# Patient Record
Sex: FEMALE | Race: WHITE
[De-identification: names, ages, dates, MRNs, and addresses within clinical notes are randomized per-mention and may not be internally consistent; named-entity substitution may affect disease eponyms.]

---

## 2018-02-08 ENCOUNTER — HOSPITAL ENCOUNTER (OUTPATIENT)
Dept: HOSPITAL 47 - MMGSC | Age: 58
Discharge: HOME | End: 2018-02-08
Payer: COMMERCIAL

## 2018-02-08 DIAGNOSIS — R00.0: ICD-10-CM

## 2018-02-08 DIAGNOSIS — J44.9: Primary | ICD-10-CM

## 2018-02-08 DIAGNOSIS — R73.09: ICD-10-CM

## 2018-02-08 DIAGNOSIS — R10.9: ICD-10-CM

## 2018-02-08 DIAGNOSIS — Z86.79: ICD-10-CM

## 2018-02-08 LAB
ALBUMIN SERPL-MCNC: 4 G/DL (ref 3.5–5)
ALP SERPL-CCNC: 102 U/L (ref 38–126)
ALT SERPL-CCNC: 28 U/L (ref 9–52)
AMYLASE SERPL-CCNC: 61 U/L (ref 30–110)
ANION GAP SERPL CALC-SCNC: 10 MMOL/L
AST SERPL-CCNC: 20 U/L (ref 14–36)
BASOPHILS # BLD AUTO: 0 K/UL (ref 0–0.2)
BASOPHILS NFR BLD AUTO: 0 %
BUN SERPL-SCNC: 18 MG/DL (ref 7–17)
CALCIUM SPEC-MCNC: 9.6 MG/DL (ref 8.4–10.2)
CHLORIDE SERPL-SCNC: 96 MMOL/L (ref 98–107)
CHOLEST SERPL-MCNC: 250 MG/DL (ref ?–200)
CO2 SERPL-SCNC: 31 MMOL/L (ref 22–30)
EOSINOPHIL # BLD AUTO: 0.1 K/UL (ref 0–0.7)
EOSINOPHIL NFR BLD AUTO: 1 %
ERYTHROCYTE [DISTWIDTH] IN BLOOD BY AUTOMATED COUNT: 4.23 M/UL (ref 3.8–5.4)
ERYTHROCYTE [DISTWIDTH] IN BLOOD: 13.5 % (ref 11.5–15.5)
GLUCOSE SERPL-MCNC: 192 MG/DL (ref 74–99)
HCT VFR BLD AUTO: 41.8 % (ref 34–46)
HDLC SERPL-MCNC: 130 MG/DL (ref 40–60)
HGB BLD-MCNC: 13.2 GM/DL (ref 11.4–16)
LDLC SERPL CALC-MCNC: 68 MG/DL (ref 0–99)
LIPASE SERPL-CCNC: 151 U/L (ref 23–300)
LYMPHOCYTES # SPEC AUTO: 1.3 K/UL (ref 1–4.8)
LYMPHOCYTES NFR SPEC AUTO: 13 %
MCH RBC QN AUTO: 31.1 PG (ref 25–35)
MCHC RBC AUTO-ENTMCNC: 31.4 G/DL (ref 31–37)
MCV RBC AUTO: 98.8 FL (ref 80–100)
MONOCYTES # BLD AUTO: 0.4 K/UL (ref 0–1)
MONOCYTES NFR BLD AUTO: 4 %
NEUTROPHILS # BLD AUTO: 7.4 K/UL (ref 1.3–7.7)
NEUTROPHILS NFR BLD AUTO: 80 %
PLATELET # BLD AUTO: 432 K/UL (ref 150–450)
POTASSIUM SERPL-SCNC: 4.4 MMOL/L (ref 3.5–5.1)
PROT SERPL-MCNC: 6.9 G/DL (ref 6.3–8.2)
SODIUM SERPL-SCNC: 137 MMOL/L (ref 137–145)
T4 FREE SERPL-MCNC: 0.81 NG/DL (ref 0.78–2.19)
THEOPHYLLINE SERPL-MCNC: 6.8 UG/ML
TRIGL SERPL-MCNC: 260 MG/DL (ref ?–150)
WBC # BLD AUTO: 9.3 K/UL (ref 3.8–10.6)

## 2018-02-08 PROCEDURE — 80053 COMPREHEN METABOLIC PANEL: CPT

## 2018-02-08 PROCEDURE — 84443 ASSAY THYROID STIM HORMONE: CPT

## 2018-02-08 PROCEDURE — 84439 ASSAY OF FREE THYROXINE: CPT

## 2018-02-08 PROCEDURE — 36415 COLL VENOUS BLD VENIPUNCTURE: CPT

## 2018-02-08 PROCEDURE — 82150 ASSAY OF AMYLASE: CPT

## 2018-02-08 PROCEDURE — 83036 HEMOGLOBIN GLYCOSYLATED A1C: CPT

## 2018-02-08 PROCEDURE — 83690 ASSAY OF LIPASE: CPT

## 2018-02-08 PROCEDURE — 85025 COMPLETE CBC W/AUTO DIFF WBC: CPT

## 2018-02-08 PROCEDURE — 80061 LIPID PANEL: CPT

## 2018-02-08 PROCEDURE — 99204 OFFICE O/P NEW MOD 45 MIN: CPT

## 2018-02-08 PROCEDURE — 80198 ASSAY OF THEOPHYLLINE: CPT

## 2018-02-09 LAB — HBA1C MFR BLD: 7.3 % (ref 4–6)

## 2018-10-26 ENCOUNTER — HOSPITAL ENCOUNTER (OUTPATIENT)
Dept: HOSPITAL 47 - RADCTMAIN | Age: 58
End: 2018-10-26
Attending: INTERNAL MEDICINE
Payer: COMMERCIAL

## 2018-10-26 DIAGNOSIS — R91.8: ICD-10-CM

## 2018-10-26 DIAGNOSIS — J18.9: Primary | ICD-10-CM

## 2018-10-26 PROCEDURE — 71250 CT THORAX DX C-: CPT

## 2018-10-26 NOTE — CT
EXAMINATION TYPE: CT chest wo con

 

DATE OF EXAM: 10/26/2018

 

COMPARISON: None

 

HISTORY: Pulmonary Nodule

 

CT DLP: 131 mGycm, Automated exposure control for dose reduction was used.

 

CONTRAST: None

 

TECHNIQUE: Axial images were obtained at 5 mm thick sections.  Reconstructed images are reviewed on [employer] computer in the coronal plane. 

 

FINDINGS: Portion of the thyroid visualized is normal.

 

Bilateral apical thickening is present. Emphysematous changes are present. Multiple small peripheral 
nodules in the range of 2 mm each are present along the anterior peripheral margins. There is a area 
of increased density measuring approximately 0.6 cm in the periphery of the left upper lobe. Series 4
 image 15. Some calcified small nodules are present scattered bilaterally.

 

No enlarged mediastinal or hilar adenopathy is evident.   The ascending aorta diameter at the level o
f the main pulmonary artery is 2.7 cm.  The main pulmonary artery diameter at the bifurcation is 2.2 
cm. Coronary artery calcification is present.

 

Limited CT sections are obtained through the upper abdomen. Abdomen is essentially unremarkable.

 

IMPRESSIONS:

1. 0.6 cm area of pneumonitis within the periphery of the left upper lung field. Follow-up exam in 6 
months is recommended.

2. Multiple 2 mm peripheral nodules bilaterally. Scattered bilateral calcified nodules are also prese
nt.

## 2019-06-03 ENCOUNTER — HOSPITAL ENCOUNTER (OUTPATIENT)
Dept: HOSPITAL 47 - RADCTMAIN | Age: 59
Discharge: HOME | End: 2019-06-03
Payer: COMMERCIAL

## 2019-06-03 DIAGNOSIS — J43.9: Primary | ICD-10-CM

## 2019-06-03 DIAGNOSIS — J92.9: ICD-10-CM

## 2019-06-03 DIAGNOSIS — R91.8: ICD-10-CM

## 2019-06-03 PROCEDURE — 71250 CT THORAX DX C-: CPT

## 2019-06-03 NOTE — CT
EXAMINATION TYPE: CT chest wo con

 

DATE OF EXAM: 6/3/2019

 

COMPARISON: 10/26/2018

 

HISTORY: Follow up scan per patient

 

CT DLP: 152 mGycm.  Automated Exposure Control for Dose Reduction was Utilized.

 

TECHNIQUE:  CT scan of the thorax is performed without IV contrast.

 

FINDINGS:

 

LUNGS: Bilateral apical thickening is present. Emphysematous changes are present. Multiple small shari
pheral nodules in the range of 2 mm each are present along the anterior peripheral margins. There is 
a area of increased density measuring approximately 0.6 cm in the periphery of the left upper lobe. S
eries 4 image 15. Some calcified small nodules are present scattered bilaterally. 

.

 

MEDIASTINUM: Lack of IV contrast is noted to limit evaluation for mediastinal and especially hilar ad
enopathy. There are no definitive greater than 1 cm hilar or mediastinal lymph nodes. Heart is mildly
 prominent and there is a trace of pericardial fluid. The ascending aorta diameter at the level of th
e main pulmonary artery is 2.7 cm. The main pulmonary artery diameter at the bifurcation is 2.2 cm. C
oronary artery calcification is present. 

 

 

OTHER: Multilevel severe degenerative disc disease with vacuum disc. Chronic appearing endplate compr
ession deformities are seen within the lower thoracic spine. Chronic appearing rib cage deformities o
n the left are noted.

 

IMPRESSION: 

1. There are numerous subcentimeter bilateral pulmonary nodules large number of which are calcified.

Findings stable. Most likely etiology is calcified and noncalcified granulomas. Confirmation of stabi
lity over 2 years recommended.

2. Diffuse emphysematous changes correlate for chronic obstructive pulmonary disease.

3. Stable apical pleural thickening.

## 2019-08-28 ENCOUNTER — HOSPITAL ENCOUNTER (OUTPATIENT)
Dept: HOSPITAL 47 - RADFLMAIN | Age: 59
Discharge: HOME | End: 2019-08-28
Attending: INTERNAL MEDICINE
Payer: COMMERCIAL

## 2019-08-28 DIAGNOSIS — K31.89: ICD-10-CM

## 2019-08-28 DIAGNOSIS — K44.9: ICD-10-CM

## 2019-08-28 DIAGNOSIS — K22.4: Primary | ICD-10-CM

## 2019-08-28 PROCEDURE — 74249: CPT

## 2019-08-29 NOTE — FL
EXAMINATION: Upper GI with small bowel follow through

 

DATE: 8/28/2019 

 

CLINICAL INDICATION: 59-year-old female epigastric pain with nausea and vomiting for one year. Prior 
history of gastric ulcer. Patient also with history of partial colonic resection with colostomy and r
eversal and 2 prior bowel obstructions.

 

COMPARISON: None 

 

Total Fluoroscopy Time: 3 minutes 25 seconds

Total images: 62

 

 

FINDINGS: 

The esophagus has a normal course, caliber, and mucosa. 

 

There are mildly blunted secondary stripping waves allowing for slightly prolonged pooling of contras
t when the patient is supine.

 

There is a small hiatal hernia seen.

 

There is no gastroesophageal reflux identified. 

 

There is mild gastric fundal fold thickening. Otherwise, the stomach and duodenum are free of any per
sistent filling defect and the remainder of the stomach and duodenum demonstrates a normal mucosal pa
ttern. No ulcer is identified.

 

Following administration of barium, serial films were carried out to 30 minutes. Barium is seen to re
ach the colon. Loops of jejunum and ileum are compressed and examined under fluoroscopy. The small narendra
wel loops have a normal-caliber. Mucosal pattern is within normal limits. No intrinsic or extrinsic p
rocess is suspected. 

 

 

IMPRESSION:

 

1. Mild dysmotility with blunted secondary stripping waves allowing for prolonged pooling of contrast
 in the esophagus when the patient is supine.

2. Small hiatal hernia. No gastroesophageal reflux is seen during the course of the exam.

3. Mild gastric fundal fold thickening may reflect mild gastritis. No discrete ulcer is seen.

4. Borderline fast small bowel transit time of 30 minutes. Otherwise, normal small bowel examination

## 2020-02-18 ENCOUNTER — HOSPITAL ENCOUNTER (INPATIENT)
Dept: HOSPITAL 47 - EC | Age: 60
LOS: 3 days | Discharge: HOME | DRG: 190 | End: 2020-02-21
Attending: INTERNAL MEDICINE | Admitting: INTERNAL MEDICINE
Payer: COMMERCIAL

## 2020-02-18 DIAGNOSIS — J44.1: Primary | ICD-10-CM

## 2020-02-18 DIAGNOSIS — Z77.22: ICD-10-CM

## 2020-02-18 DIAGNOSIS — J98.4: ICD-10-CM

## 2020-02-18 DIAGNOSIS — J96.22: ICD-10-CM

## 2020-02-18 DIAGNOSIS — K58.1: ICD-10-CM

## 2020-02-18 DIAGNOSIS — Z79.899: ICD-10-CM

## 2020-02-18 DIAGNOSIS — Z86.19: ICD-10-CM

## 2020-02-18 DIAGNOSIS — E87.3: ICD-10-CM

## 2020-02-18 DIAGNOSIS — F41.9: ICD-10-CM

## 2020-02-18 DIAGNOSIS — Z79.84: ICD-10-CM

## 2020-02-18 DIAGNOSIS — J96.21: ICD-10-CM

## 2020-02-18 DIAGNOSIS — E78.00: ICD-10-CM

## 2020-02-18 DIAGNOSIS — K29.70: ICD-10-CM

## 2020-02-18 DIAGNOSIS — Z90.49: ICD-10-CM

## 2020-02-18 DIAGNOSIS — Z79.82: ICD-10-CM

## 2020-02-18 DIAGNOSIS — E11.9: ICD-10-CM

## 2020-02-18 DIAGNOSIS — M19.90: ICD-10-CM

## 2020-02-18 DIAGNOSIS — I10: ICD-10-CM

## 2020-02-18 DIAGNOSIS — Z79.52: ICD-10-CM

## 2020-02-18 DIAGNOSIS — Z87.891: ICD-10-CM

## 2020-02-18 DIAGNOSIS — Z98.890: ICD-10-CM

## 2020-02-18 DIAGNOSIS — E78.5: ICD-10-CM

## 2020-02-18 DIAGNOSIS — D53.9: ICD-10-CM

## 2020-02-18 DIAGNOSIS — Z99.81: ICD-10-CM

## 2020-02-18 DIAGNOSIS — G89.29: ICD-10-CM

## 2020-02-18 LAB
ALBUMIN SERPL-MCNC: 3.5 G/DL (ref 3.5–5)
ALP SERPL-CCNC: 54 U/L (ref 38–126)
ALT SERPL-CCNC: 10 U/L (ref 4–34)
ANION GAP SERPL CALC-SCNC: 8 MMOL/L
APTT BLD: 25.6 SEC (ref 22–30)
AST SERPL-CCNC: 18 U/L (ref 14–36)
BUN SERPL-SCNC: 13 MG/DL (ref 7–17)
CALCIUM SPEC-MCNC: 8.6 MG/DL (ref 8.4–10.2)
CELLS COUNTED: 100
CHLORIDE SERPL-SCNC: 92 MMOL/L (ref 98–107)
CO2 SERPL-SCNC: 38 MMOL/L (ref 22–30)
EOSINOPHIL # BLD MANUAL: 0.1 K/UL (ref 0–0.7)
ERYTHROCYTE [DISTWIDTH] IN BLOOD BY AUTOMATED COUNT: 3.24 M/UL (ref 3.8–5.4)
ERYTHROCYTE [DISTWIDTH] IN BLOOD: 12.3 % (ref 11.5–15.5)
GLUCOSE BLD-MCNC: 144 MG/DL (ref 75–99)
GLUCOSE BLD-MCNC: 191 MG/DL (ref 75–99)
GLUCOSE SERPL-MCNC: 118 MG/DL (ref 74–99)
HCO3 BLDV-SCNC: 39 MMOL/L (ref 24–28)
HCT VFR BLD AUTO: 33.9 % (ref 34–46)
HGB BLD-MCNC: 10.1 GM/DL (ref 11.4–16)
INR PPP: 1 (ref ?–1.2)
LYMPHOCYTES # BLD MANUAL: 0.78 K/UL (ref 1–4.8)
MAGNESIUM SPEC-SCNC: 1.6 MG/DL (ref 1.6–2.3)
MCH RBC QN AUTO: 31.3 PG (ref 25–35)
MCHC RBC AUTO-ENTMCNC: 29.9 G/DL (ref 31–37)
MCV RBC AUTO: 104.7 FL (ref 80–100)
MONOCYTES # BLD MANUAL: 0.42 K/UL (ref 0–1)
NEUTROPHILS NFR BLD MANUAL: 74 %
NEUTS SEG # BLD MANUAL: 3.9 K/UL (ref 1.3–7.7)
PCO2 BLDV: 63 MMHG (ref 37–51)
PH BLDV: 7.41 [PH] (ref 7.31–7.41)
PLATELET # BLD AUTO: 319 K/UL (ref 150–450)
POTASSIUM SERPL-SCNC: 4.2 MMOL/L (ref 3.5–5.1)
PROT SERPL-MCNC: 6 G/DL (ref 6.3–8.2)
PT BLD: 10.2 SEC (ref 9–12)
SODIUM SERPL-SCNC: 138 MMOL/L (ref 137–145)
WBC # BLD AUTO: 5.2 K/UL (ref 3.8–10.6)

## 2020-02-18 PROCEDURE — 96374 THER/PROPH/DIAG INJ IV PUSH: CPT

## 2020-02-18 PROCEDURE — 83690 ASSAY OF LIPASE: CPT

## 2020-02-18 PROCEDURE — 80048 BASIC METABOLIC PNL TOTAL CA: CPT

## 2020-02-18 PROCEDURE — 83735 ASSAY OF MAGNESIUM: CPT

## 2020-02-18 PROCEDURE — 94660 CPAP INITIATION&MGMT: CPT

## 2020-02-18 PROCEDURE — 82747 ASSAY OF FOLIC ACID RBC: CPT

## 2020-02-18 PROCEDURE — 94640 AIRWAY INHALATION TREATMENT: CPT

## 2020-02-18 PROCEDURE — 85610 PROTHROMBIN TIME: CPT

## 2020-02-18 PROCEDURE — 94760 N-INVAS EAR/PLS OXIMETRY 1: CPT

## 2020-02-18 PROCEDURE — 82803 BLOOD GASES ANY COMBINATION: CPT

## 2020-02-18 PROCEDURE — 71046 X-RAY EXAM CHEST 2 VIEWS: CPT

## 2020-02-18 PROCEDURE — 96375 TX/PRO/DX INJ NEW DRUG ADDON: CPT

## 2020-02-18 PROCEDURE — 87040 BLOOD CULTURE FOR BACTERIA: CPT

## 2020-02-18 PROCEDURE — 85730 THROMBOPLASTIN TIME PARTIAL: CPT

## 2020-02-18 PROCEDURE — 96361 HYDRATE IV INFUSION ADD-ON: CPT

## 2020-02-18 PROCEDURE — 93005 ELECTROCARDIOGRAM TRACING: CPT

## 2020-02-18 PROCEDURE — 87502 INFLUENZA DNA AMP PROBE: CPT

## 2020-02-18 PROCEDURE — 36415 COLL VENOUS BLD VENIPUNCTURE: CPT

## 2020-02-18 PROCEDURE — 84484 ASSAY OF TROPONIN QUANT: CPT

## 2020-02-18 PROCEDURE — 85025 COMPLETE CBC W/AUTO DIFF WBC: CPT

## 2020-02-18 PROCEDURE — 96372 THER/PROPH/DIAG INJ SC/IM: CPT

## 2020-02-18 PROCEDURE — 99285 EMERGENCY DEPT VISIT HI MDM: CPT

## 2020-02-18 PROCEDURE — 96365 THER/PROPH/DIAG IV INF INIT: CPT

## 2020-02-18 PROCEDURE — 82607 VITAMIN B-12: CPT

## 2020-02-18 PROCEDURE — 78227 HEPATOBIL SYST IMAGE W/DRUG: CPT

## 2020-02-18 PROCEDURE — 80053 COMPREHEN METABOLIC PANEL: CPT

## 2020-02-18 RX ADMIN — ATENOLOL SCH: 25 TABLET ORAL at 19:30

## 2020-02-18 RX ADMIN — IPRATROPIUM BROMIDE AND ALBUTEROL SULFATE PRN ML: .5; 3 SOLUTION RESPIRATORY (INHALATION) at 16:33

## 2020-02-18 RX ADMIN — HYDROCODONE BITARTRATE AND ACETAMINOPHEN PRN EACH: 10; 325 TABLET ORAL at 18:58

## 2020-02-18 RX ADMIN — PANTOPRAZOLE SODIUM SCH MG: 40 TABLET, DELAYED RELEASE ORAL at 17:21

## 2020-02-18 RX ADMIN — IPRATROPIUM BROMIDE AND ALBUTEROL SULFATE PRN ML: .5; 3 SOLUTION RESPIRATORY (INHALATION) at 20:29

## 2020-02-18 RX ADMIN — METHYLPREDNISOLONE SODIUM SUCCINATE SCH MG: 125 INJECTION, POWDER, FOR SOLUTION INTRAMUSCULAR; INTRAVENOUS at 23:18

## 2020-02-18 RX ADMIN — HEPARIN SODIUM SCH UNIT: 5000 INJECTION, SOLUTION INTRAVENOUS; SUBCUTANEOUS at 23:18

## 2020-02-18 RX ADMIN — HEPARIN SODIUM SCH UNIT: 5000 INJECTION, SOLUTION INTRAVENOUS; SUBCUTANEOUS at 17:21

## 2020-02-18 RX ADMIN — MONTELUKAST SODIUM SCH MG: 10 TABLET, FILM COATED ORAL at 19:31

## 2020-02-18 RX ADMIN — METHYLPREDNISOLONE SODIUM SUCCINATE SCH MG: 125 INJECTION, POWDER, FOR SOLUTION INTRAMUSCULAR; INTRAVENOUS at 17:21

## 2020-02-18 RX ADMIN — DILTIAZEM HYDROCHLORIDE SCH MG: 240 CAPSULE, COATED, EXTENDED RELEASE ORAL at 17:20

## 2020-02-18 RX ADMIN — ATENOLOL SCH MG: 25 TABLET ORAL at 17:21

## 2020-02-18 NOTE — P.HPIM
History of Present Illness


H&P Date: 02/18/20


Chief Complaint: Difficulty breathing





59-year-old female with history of COPD on home oxygen 2-4 L per nasal cannula





Patient comes in with 1-2 day history of worsening shortness of breath she 

reports that her inhalers and nebulizers are not helping with her symptoms.  She

reports the last hospitalization for trouble breathing was within 1 year but she

has not been intubated over the past year.  She denies any fevers or chills she 

denies any sick contacts or recent traveling.  She denies any upper respiratory 

symptoms of runny nose sore throat.  She reports chronic cough nonproductive.  

She denies any fevers or chills.  She denies any chest pain.  Her son 

recommended to her that she gets evaluated because she started using the oxygen 

more than her usual usage of occasional 2 L now she is using 4 L nasal cannula 

despite that she's not getting much improvement and relief in her breathing.  

For which she decided to come to the hospital for evaluation


An the ED chest x-ray failed to show any clear infiltrations suggestive of 

pneumonia.  Showed old ground glass appearance which is compatible compared to 

her older CAT scan.


Patient was tachycardic and hypoxic in the ED.


Blood work showed chronic anemia but no leukocytosis


ABG showed elevated CO2.


Patient didn't improve much after multiple breathing treatments in the ED she 

will be admitted for acute COPD exacerbation





Patient also admitted that she is complaining of chronic epigastric abdominal 

pain she felt frustrated about it, she reported that the pain has been going on 

for over a year now she has seen multiple doctors for that including a GI 

specialist and again she claims that nothing has been helping and no one seems 

to care enough to do anything about it





Review of Systems





 











Pertinent positives as noted in HPI. All other systems were reviewed and are 

negative 





Past Medical History


Past Medical History: COPD


History of Any Multi-Drug Resistant Organisms: None Reported


Additional Past Surgical History / Comment(s): "abdominal surgeries."


Past Psychological History: Anxiety


Smoking Status: Never smoker


Past Alcohol Use History: None Reported


Past Drug Use History: None Reported





- Past Family History


  ** Family


Family Medical History: No Reported History





Medications and Allergies


                                    Allergies











Allergy/AdvReac Type Severity Reaction Status Date / Time


 


No Known Allergies Allergy   Verified 02/18/20 15:47














Physical Exam


Vitals: 


                                   Vital Signs











  Temp Pulse Resp BP Pulse Ox


 


 02/18/20 13:36   114 H  28 H  151/88  98


 


 02/18/20 12:20   105 H  20  129/91  99


 


 02/18/20 12:10   102 H   


 


 02/18/20 12:00      98


 


 02/18/20 11:55   100    78 L


 


 02/18/20 11:15      98


 


 02/18/20 11:01  97.2 F L  102 H  18  148/67  100








                                Intake and Output











 02/17/20 02/18/20 02/18/20





 22:59 06:59 14:59


 


Other:   


 


  Weight   50.802 kg














Constitutional:          No acute distress, conversant, pleasant


Eyes:      Anicteric sclerae, moist conjunctiva, 


         Pupils equal round reactive to light





ENMT:      NC/AT


         Oropharynx clear, no erythema, exudates





Neck:      Supple, FROM, no masses, or JVD


         No carotid bruits


         No thyromegaly





Lungs:      Diminished breath sounds throughout prolonged expiratory phase


         Clear to percussion


         Respiratory distress with accessory muscle use





Cardiovascular:      Heart regular in rate and rhythm, 


         No murmurs, gallops, or rubs


         No peripheral edema





Abdominal:       Soft


         Epigastric discomfort to deep palpation, no guarding, rebound or 

rigidity


         Abdomen moving with respiration


         Normoactive bowel sounds


         No hepatomegaly, No splenomegaly


         No palpable mass 


         No abdominal wall hernia noted 





Skin:      Normal temperature, tone, texture, turgor


         No induration


         No subcutaneous nodules 


         No rash, lesions


         No ulcers





Extremities:      No digital cyanosis 


         No clubbing


         Pedal pulses intact and symmetrical


         Radial pulses intact and symmetrical 


         No calf tenderness 





Psychiatric:      Alert and oriented to person, place and time


         Appropriate affect


         fair judgement   


      


Neuro      Muscles Strength 4/5 in all 4 extremities 


         Sensation to light touch grossly present throughout


         Cranial nerves II-XII grossly intact


         No focal sensory deficits


Lymphatics:       no palpable cervical or supraclavicular , or inguinal lymph 

nodes  





Results


CBC & Chem 7: 


                                 02/18/20 11:07





                                 02/18/20 11:07


Labs: 


                  Abnormal Lab Results - Last 24 Hours (Table)











  02/18/20 02/18/20 02/18/20 Range/Units





  11:07 11:07 12:06 


 


RBC  3.24 L    (3.80-5.40)  m/uL


 


Hgb  10.1 L    (11.4-16.0)  gm/dL


 


Hct  33.9 L    (34.0-46.0)  %


 


MCV  104.7 H    (80.0-100.0)  fL


 


MCHC  29.9 L    (31.0-37.0)  g/dL


 


Lymphocytes # (Manual)  0.78 L    (1.0-4.8)  k/uL


 


VBG pCO2    63 H  (37-51)  mmHg


 


VBG HCO3    39 H  (24-28)  mmol/L


 


Chloride   92 L   ()  mmol/L


 


Carbon Dioxide   38 H   (22-30)  mmol/L


 


Creatinine   0.35 L   (0.52-1.04)  mg/dL


 


Glucose   118 H   (74-99)  mg/dL


 


Total Protein   6.0 L   (6.3-8.2)  g/dL














Assessment and Plan


Assessment: 





59-year-old female with COPD on home oxygen 2-4 L comes in due to worsening 

shortness of breath over the past 1-2 days denies any upper respiratory 

infection like symptoms.  In the ED she was found to be hypoxic and tachycardic 

despite repeated breathing treatments patient didn't make much improvement she 

is admitted for acute COPD exacerbation with anticipated length of stay more 

than two midnight


Plan: 





Acute hypoxic hypercapnic respiratory failure secondary to acute COPD 

exacerbation


COPD pathway systemic IV steroids, doxycycline, nebulizers inhalers when 

necessary


CPT


supplemental oxygen as needed


influenza negative


BIpap PRN 10/5








epigastric abd pain 


PPI 


maalox


check lipase


GI consult 





DVT PPX sc heparin tid 





chronic anemia macrocytic 


check B 12 , RBC folate


 


CODE STATUS: no code


Discussed with: Patient, ER, rn


Anticipated length of stay  > than 2 midnights


Anticipated discharge place: home


A total of 60 minutes was spent on the care of this complex patient more than 

50% of the time was spent in counseling and care coordination.

## 2020-02-18 NOTE — XR
EXAMINATION TYPE: XR chest 2V

 

DATE OF EXAM: 2/18/2020

 

COMPARISON: NONE

 

TECHNIQUE: PA and lateral views submitted.

 

HISTORY: Difficulty breathing

 

FINDINGS:

The lungs are clear and  there is no pneumothorax, pleural effusion, or focal pneumonia. Hyperinflati
on suggests COPD. Degenerative changes spine. There are nodular densities suggestive of calcified gra
nuloma. Previous CT demonstrated multiple calcified and noncalcified nodule sclerotic density overlyi
ng the humeral head could represent a bone island. Biapical pleural thickening. Heart size normal. Di
ffuse osteopenia.

 

IMPRESSION:

1. COPD with some subcentimeter pulmonary nodules most likely on the basis of granuloma as reported b
y previous CT scan.

2. Coarsened interstitium can be associated with chronic interstitial lung disease correlate clinical
ly.

## 2020-02-18 NOTE — ED
General Adult HPI





- General


Chief complaint: Shortness of Breath


Stated complaint: MICH


Time Seen by Provider: 02/18/20 11:13


Source: patient, EMS, RN notes reviewed


Mode of arrival: EMS


Limitations: no limitations





- History of Present Illness


Initial comments: 





59-year-old female with a past medical history of COPD on home oxygen presents 

to the emergency department for a chief complaint of shortness of breath.  

Patient states yesterday she started to develop increased shortness of breath.  

Patient states that usually she is on 2 L when she is resting at home and 4 L 

when she is moving.  Patient also admits to a slight cough over the past few 

days.  She denies any chest pain.  She denies fevers or chills. Patient has no 

other complaints at this time including shortness of breath, chest pain, 

abdominal pain, nausea or vomiting, headache, or visual changes.





- Related Data


                                    Allergies











Allergy/AdvReac Type Severity Reaction Status Date / Time


 


No Known Allergies Allergy   Verified 02/18/20 11:00














Review of Systems


ROS Statement: 


Those systems with pertinent positive or pertinent negative responses have been 

documented in the HPI.





ROS Other: All systems not noted in ROS Statement are negative.





Past Medical History


Past Medical History: COPD


History of Any Multi-Drug Resistant Organisms: None Reported


Additional Past Surgical History / Comment(s): "abdominal surgeries."


Past Psychological History: Anxiety


Smoking Status: Never smoker


Past Alcohol Use History: None Reported


Past Drug Use History: None Reported





General Exam


Limitations: no limitations


General appearance: alert, in no apparent distress


Head exam: Present: atraumatic, normocephalic, normal inspection


Eye exam: Present: normal appearance, PERRL, EOMI.  Absent: scleral icterus, 

conjunctival injection, periorbital swelling


ENT exam: Present: normal exam, mucous membranes moist


Neck exam: Present: normal inspection, full ROM.  Absent: tenderness, 

meningismus, lymphadenopathy


Respiratory exam: Present: wheezes, decreased breath sounds.  Absent: 

respiratory distress, rales, rhonchi, stridor


Cardiovascular Exam: Present: regular rate, normal rhythm, normal heart sounds. 

Absent: systolic murmur, diastolic murmur, rubs, gallop, clicks


Neurological exam: Present: alert


Psychiatric exam: Present: normal affect, normal mood





Course


                                   Vital Signs











  02/18/20 02/18/20 02/18/20





  11:01 11:15 11:55


 


Temperature 97.2 F L  


 


Pulse Rate 102 H  100


 


Respiratory 18  





Rate   


 


Blood Pressure 148/67  


 


O2 Sat by Pulse 100 98 78 L





Oximetry   














  02/18/20 02/18/20 02/18/20





  12:00 12:10 12:20


 


Temperature   


 


Pulse Rate  102 H 105 H


 


Respiratory   20





Rate   


 


Blood Pressure   129/91


 


O2 Sat by Pulse 98  99





Oximetry   














EKG Findings





- EKG Comments:


EKG Findings:: Normal sinus rhythm, ventricular rate 100, AK interval 140, QTC 

456





Medical Decision Making





- Medical Decision Making





Vitals are stable presentation.  Patient is 100% on 4 L although it seems she is

supposed to be on 2 L at home at rest.  She does have some minimal tachycardia 

likely related to shortness of breath.  Lung sounds do sound diminished with 

wheezing bilaterally.  Patient was on 2 L of oxygen and apparently desatted to 

78% so she was increased back to 4 L. CBC unremarkable.  White blood cell count 

is normal.  CMP does show high CO2 which is likely chronic but increased 

compared to previous labs.  Influenza is negative.  Chest x-ray shows COPD with 

some subcentimeter pulmonary nodules most likely on the basis of granuloma.  

Patient will be admitted for acute hypoxic respiratory failure secondary to COPD

exacerbation.





- Lab Data


Result diagrams: 


                                 02/18/20 11:07





                                 02/18/20 11:07


                                   Lab Results











  02/18/20 02/18/20 02/18/20 Range/Units





  11:07 11:07 11:07 


 


WBC  5.2    (3.8-10.6)  k/uL


 


RBC  3.24 L    (3.80-5.40)  m/uL


 


Hgb  10.1 L    (11.4-16.0)  gm/dL


 


Hct  33.9 L    (34.0-46.0)  %


 


MCV  104.7 H    (80.0-100.0)  fL


 


MCH  31.3    (25.0-35.0)  pg


 


MCHC  29.9 L    (31.0-37.0)  g/dL


 


RDW  12.3    (11.5-15.5)  %


 


Plt Count  319    (150-450)  k/uL


 


Neutrophils % (Manual)  74    %


 


Band Neutrophils %  1    %


 


Lymphocytes % (Manual)  15    %


 


Monocytes % (Manual)  8    %


 


Eosinophils % (Manual)  2    %


 


Neutrophils # (Manual)  3.90    (1.3-7.7)  k/uL


 


Lymphocytes # (Manual)  0.78 L    (1.0-4.8)  k/uL


 


Monocytes # (Manual)  0.42    (0-1.0)  k/uL


 


Eosinophils # (Manual)  0.10    (0-0.7)  k/uL


 


Nucleated RBCs  0    (0-0)  /100 WBC


 


Manual Slide Review  Performed    


 


Hypochromasia  Marked    


 


Poikilocytosis (manual  Present    


 


Macrocytosis  Slight    


 


PT    10.2  (9.0-12.0)  sec


 


INR    1.0  (<1.2)  


 


APTT    25.6  (22.0-30.0)  sec


 


VBG pH     (7.31-7.41)  


 


VBG pCO2     (37-51)  mmHg


 


VBG HCO3     (24-28)  mmol/L


 


Sodium   138   (137-145)  mmol/L


 


Potassium   4.2   (3.5-5.1)  mmol/L


 


Chloride   92 L   ()  mmol/L


 


Carbon Dioxide   38 H   (22-30)  mmol/L


 


Anion Gap   8   mmol/L


 


BUN   13   (7-17)  mg/dL


 


Creatinine   0.35 L   (0.52-1.04)  mg/dL


 


Est GFR (CKD-EPI)AfAm   >90   (>60 ml/min/1.73 sqM)  


 


Est GFR (CKD-EPI)NonAf   >90   (>60 ml/min/1.73 sqM)  


 


Glucose   118 H   (74-99)  mg/dL


 


Calcium   8.6   (8.4-10.2)  mg/dL


 


Magnesium   1.6   (1.6-2.3)  mg/dL


 


Total Bilirubin   0.6   (0.2-1.3)  mg/dL


 


AST   18   (14-36)  U/L


 


ALT   10   (4-34)  U/L


 


Alkaline Phosphatase   54   ()  U/L


 


Troponin I     (0.000-0.034)  ng/mL


 


Total Protein   6.0 L   (6.3-8.2)  g/dL


 


Albumin   3.5   (3.5-5.0)  g/dL


 


Influenza Type A RNA     (Not Detectd)  


 


Influenza Type B (PCR)     (Not Detectd)  














  02/18/20 02/18/20 02/18/20 Range/Units





  11:07 11:07 12:06 


 


WBC     (3.8-10.6)  k/uL


 


RBC     (3.80-5.40)  m/uL


 


Hgb     (11.4-16.0)  gm/dL


 


Hct     (34.0-46.0)  %


 


MCV     (80.0-100.0)  fL


 


MCH     (25.0-35.0)  pg


 


MCHC     (31.0-37.0)  g/dL


 


RDW     (11.5-15.5)  %


 


Plt Count     (150-450)  k/uL


 


Neutrophils % (Manual)     %


 


Band Neutrophils %     %


 


Lymphocytes % (Manual)     %


 


Monocytes % (Manual)     %


 


Eosinophils % (Manual)     %


 


Neutrophils # (Manual)     (1.3-7.7)  k/uL


 


Lymphocytes # (Manual)     (1.0-4.8)  k/uL


 


Monocytes # (Manual)     (0-1.0)  k/uL


 


Eosinophils # (Manual)     (0-0.7)  k/uL


 


Nucleated RBCs     (0-0)  /100 WBC


 


Manual Slide Review     


 


Hypochromasia     


 


Poikilocytosis (manual     


 


Macrocytosis     


 


PT     (9.0-12.0)  sec


 


INR     (<1.2)  


 


APTT     (22.0-30.0)  sec


 


VBG pH    7.41  (7.31-7.41)  


 


VBG pCO2    63 H  (37-51)  mmHg


 


VBG HCO3    39 H  (24-28)  mmol/L


 


Sodium     (137-145)  mmol/L


 


Potassium     (3.5-5.1)  mmol/L


 


Chloride     ()  mmol/L


 


Carbon Dioxide     (22-30)  mmol/L


 


Anion Gap     mmol/L


 


BUN     (7-17)  mg/dL


 


Creatinine     (0.52-1.04)  mg/dL


 


Est GFR (CKD-EPI)AfAm     (>60 ml/min/1.73 sqM)  


 


Est GFR (CKD-EPI)NonAf     (>60 ml/min/1.73 sqM)  


 


Glucose     (74-99)  mg/dL


 


Calcium     (8.4-10.2)  mg/dL


 


Magnesium     (1.6-2.3)  mg/dL


 


Total Bilirubin     (0.2-1.3)  mg/dL


 


AST     (14-36)  U/L


 


ALT     (4-34)  U/L


 


Alkaline Phosphatase     ()  U/L


 


Troponin I  <0.012    (0.000-0.034)  ng/mL


 


Total Protein     (6.3-8.2)  g/dL


 


Albumin     (3.5-5.0)  g/dL


 


Influenza Type A RNA   Not Detected   (Not Detectd)  


 


Influenza Type B (PCR)   Not Detected   (Not Detectd)  














Disposition


Clinical Impression: 


 COPD exacerbation, Acute respiratory failure with hypoxia





Disposition: ADMITTED AS IP TO THIS HOSP


Condition: Fair


Is patient prescribed a controlled substance at d/c from ED?: No


Referrals: 


Kosta Ledesma MD [Primary Care Provider] - 1-2 days


Time of Disposition: 13:26

## 2020-02-19 LAB
ANION GAP SERPL CALC-SCNC: 8 MMOL/L
BASOPHILS # BLD AUTO: 0 K/UL (ref 0–0.2)
BASOPHILS NFR BLD AUTO: 0 %
BUN SERPL-SCNC: 16 MG/DL (ref 7–17)
CALCIUM SPEC-MCNC: 8.8 MG/DL (ref 8.4–10.2)
CHLORIDE SERPL-SCNC: 94 MMOL/L (ref 98–107)
CO2 SERPL-SCNC: 35 MMOL/L (ref 22–30)
EOSINOPHIL # BLD AUTO: 0 K/UL (ref 0–0.7)
EOSINOPHIL NFR BLD AUTO: 1 %
ERYTHROCYTE [DISTWIDTH] IN BLOOD BY AUTOMATED COUNT: 3.2 M/UL (ref 3.8–5.4)
ERYTHROCYTE [DISTWIDTH] IN BLOOD: 12.4 % (ref 11.5–15.5)
GLUCOSE BLD-MCNC: 151 MG/DL (ref 75–99)
GLUCOSE BLD-MCNC: 160 MG/DL (ref 75–99)
GLUCOSE BLD-MCNC: 175 MG/DL (ref 75–99)
GLUCOSE BLD-MCNC: 177 MG/DL (ref 75–99)
GLUCOSE SERPL-MCNC: 186 MG/DL (ref 74–99)
HCT VFR BLD AUTO: 32.6 % (ref 34–46)
HGB BLD-MCNC: 10.3 GM/DL (ref 11.4–16)
LYMPHOCYTES # SPEC AUTO: 0.3 K/UL (ref 1–4.8)
LYMPHOCYTES NFR SPEC AUTO: 9 %
MCH RBC QN AUTO: 32.2 PG (ref 25–35)
MCHC RBC AUTO-ENTMCNC: 31.6 G/DL (ref 31–37)
MCV RBC AUTO: 101.9 FL (ref 80–100)
MONOCYTES # BLD AUTO: 0.1 K/UL (ref 0–1)
MONOCYTES NFR BLD AUTO: 2 %
NEUTROPHILS # BLD AUTO: 2.9 K/UL (ref 1.3–7.7)
NEUTROPHILS NFR BLD AUTO: 87 %
PLATELET # BLD AUTO: 346 K/UL (ref 150–450)
POTASSIUM SERPL-SCNC: 4.6 MMOL/L (ref 3.5–5.1)
SODIUM SERPL-SCNC: 137 MMOL/L (ref 137–145)
VIT B12 SERPL-MCNC: 337 PG/ML (ref 200–944)
WBC # BLD AUTO: 3.3 K/UL (ref 3.8–10.6)

## 2020-02-19 PROCEDURE — 5A09457 ASSISTANCE WITH RESPIRATORY VENTILATION, 24-96 CONSECUTIVE HOURS, CONTINUOUS POSITIVE AIRWAY PRESSURE: ICD-10-PCS

## 2020-02-19 RX ADMIN — DILTIAZEM HYDROCHLORIDE SCH MG: 240 CAPSULE, COATED, EXTENDED RELEASE ORAL at 08:45

## 2020-02-19 RX ADMIN — ESCITALOPRAM OXALATE SCH MG: 20 TABLET, FILM COATED ORAL at 08:45

## 2020-02-19 RX ADMIN — FORMOTEROL FUMARATE DIHYDRATE SCH MCG: 20 SOLUTION RESPIRATORY (INHALATION) at 19:12

## 2020-02-19 RX ADMIN — INSULIN ASPART SCH UNIT: 100 INJECTION, SOLUTION INTRAVENOUS; SUBCUTANEOUS at 11:53

## 2020-02-19 RX ADMIN — IPRATROPIUM BROMIDE SCH MG: 0.5 SOLUTION RESPIRATORY (INHALATION) at 15:29

## 2020-02-19 RX ADMIN — HYDROCODONE BITARTRATE AND ACETAMINOPHEN PRN EACH: 10; 325 TABLET ORAL at 18:19

## 2020-02-19 RX ADMIN — HEPARIN SODIUM SCH UNIT: 5000 INJECTION, SOLUTION INTRAVENOUS; SUBCUTANEOUS at 23:07

## 2020-02-19 RX ADMIN — INSULIN ASPART SCH UNIT: 100 INJECTION, SOLUTION INTRAVENOUS; SUBCUTANEOUS at 08:46

## 2020-02-19 RX ADMIN — ASPIRIN SCH: 325 TABLET ORAL at 11:43

## 2020-02-19 RX ADMIN — CYANOCOBALAMIN SCH MCG: 1000 INJECTION, SOLUTION INTRAMUSCULAR; SUBCUTANEOUS at 18:22

## 2020-02-19 RX ADMIN — METHYLPREDNISOLONE SODIUM SUCCINATE SCH MG: 125 INJECTION, POWDER, FOR SOLUTION INTRAMUSCULAR; INTRAVENOUS at 11:53

## 2020-02-19 RX ADMIN — HYDROCODONE BITARTRATE AND ACETAMINOPHEN PRN EACH: 10; 325 TABLET ORAL at 04:15

## 2020-02-19 RX ADMIN — BUDESONIDE SCH MG: 1 SUSPENSION RESPIRATORY (INHALATION) at 19:10

## 2020-02-19 RX ADMIN — ATENOLOL SCH MG: 25 TABLET ORAL at 20:19

## 2020-02-19 RX ADMIN — POLYETHYLENE GLYCOL 3350 SCH: 17 POWDER, FOR SOLUTION ORAL at 08:47

## 2020-02-19 RX ADMIN — ATENOLOL SCH MG: 25 TABLET ORAL at 08:44

## 2020-02-19 RX ADMIN — INSULIN ASPART SCH UNIT: 100 INJECTION, SOLUTION INTRAVENOUS; SUBCUTANEOUS at 20:22

## 2020-02-19 RX ADMIN — METHYLPREDNISOLONE SODIUM SUCCINATE SCH MG: 125 INJECTION, POWDER, FOR SOLUTION INTRAMUSCULAR; INTRAVENOUS at 18:17

## 2020-02-19 RX ADMIN — PANTOPRAZOLE SODIUM SCH MG: 40 TABLET, DELAYED RELEASE ORAL at 08:45

## 2020-02-19 RX ADMIN — IPRATROPIUM BROMIDE SCH MG: 0.5 SOLUTION RESPIRATORY (INHALATION) at 08:54

## 2020-02-19 RX ADMIN — AZITHROMYCIN SCH MG: 250 TABLET, FILM COATED ORAL at 08:45

## 2020-02-19 RX ADMIN — METHYLPREDNISOLONE SODIUM SUCCINATE SCH MG: 125 INJECTION, POWDER, FOR SOLUTION INTRAMUSCULAR; INTRAVENOUS at 05:52

## 2020-02-19 RX ADMIN — HYDROCODONE BITARTRATE AND ACETAMINOPHEN PRN EACH: 10; 325 TABLET ORAL at 08:44

## 2020-02-19 RX ADMIN — IPRATROPIUM BROMIDE AND ALBUTEROL SULFATE PRN ML: .5; 3 SOLUTION RESPIRATORY (INHALATION) at 04:36

## 2020-02-19 RX ADMIN — METHYLPREDNISOLONE SODIUM SUCCINATE SCH MG: 125 INJECTION, POWDER, FOR SOLUTION INTRAMUSCULAR; INTRAVENOUS at 23:07

## 2020-02-19 RX ADMIN — HEPARIN SODIUM SCH UNIT: 5000 INJECTION, SOLUTION INTRAVENOUS; SUBCUTANEOUS at 18:18

## 2020-02-19 RX ADMIN — HEPARIN SODIUM SCH UNIT: 5000 INJECTION, SOLUTION INTRAVENOUS; SUBCUTANEOUS at 08:47

## 2020-02-19 RX ADMIN — IPRATROPIUM BROMIDE SCH MG: 0.5 SOLUTION RESPIRATORY (INHALATION) at 12:24

## 2020-02-19 RX ADMIN — MONTELUKAST SODIUM SCH MG: 10 TABLET, FILM COATED ORAL at 20:19

## 2020-02-19 RX ADMIN — INSULIN ASPART SCH UNIT: 100 INJECTION, SOLUTION INTRAVENOUS; SUBCUTANEOUS at 18:25

## 2020-02-19 RX ADMIN — IPRATROPIUM BROMIDE SCH MG: 0.5 SOLUTION RESPIRATORY (INHALATION) at 19:10

## 2020-02-19 RX ADMIN — PANTOPRAZOLE SODIUM SCH MG: 40 TABLET, DELAYED RELEASE ORAL at 18:19

## 2020-02-19 NOTE — P.PN
Subjective


Progress Note Date: 02/19/20


Principal diagnosis: 





follow up for acute COPD exacerbation 





Patient seen and examined still reporting difficulty breathing and some 

component of anxiety


Denies any chest pain


Denies any nausea vomiting , she is still having epigastric discomfort





Objective





- Vital Signs


Vital signs: 


                                   Vital Signs











Temp  97.9 F   02/19/20 07:42


 


Pulse  88   02/19/20 09:05


 


Resp  14   02/19/20 08:00


 


BP  147/81   02/19/20 07:42


 


Pulse Ox  98   02/19/20 07:42








                                 Intake & Output











 02/18/20 02/19/20 02/19/20





 18:59 06:59 18:59


 


Intake Total  960 


 


Balance  960 


 


Weight 50.802 kg  


 


Intake:   


 


  Intake, IV Titration  900 





  Amount   


 


    Sodium Chloride 0.9% 1,  900 





    000 ml @ 75 mls/hr IV .   





    [id number] STA Rx#:[id number]   


 


  Oral  60 


 


Other:   


 


  Voiding Method  Bedside Commode Bedside Commode


 


  # Voids  2 














- Exam





Constitutional:   vital signs stable, Not in acute distress, pleasant, 

conversant 


Lungs: Diminished breath sounds throughout, patient using accessory muscles of 

respiration 


Cardiovascular: Regular rate and rhythm, no murmurs, no gallops, no rubs, no 

peripheral edema


Gastrointestinal: Soft, no tenderness to palpation,  bowel sounds positive 


Extremities: No digital cyanosis or clubbing, peripheral pulses palpable and 

equal ,  no calf muscle tenderness


Psych: Alert, oriented to place, person and time, appropriate affect, intact 

judgment  








- Labs


CBC & Chem 7: 


                                 02/18/20 11:07





                                 02/19/20 09:08


Labs: 


                  Abnormal Lab Results - Last 24 Hours (Table)











  02/18/20 02/18/20 02/18/20 Range/Units





  11:07 11:07 12:06 


 


RBC  3.24 L    (3.80-5.40)  m/uL


 


Hgb  10.1 L    (11.4-16.0)  gm/dL


 


Hct  33.9 L    (34.0-46.0)  %


 


MCV  104.7 H    (80.0-100.0)  fL


 


MCHC  29.9 L    (31.0-37.0)  g/dL


 


Lymphocytes # (Manual)  0.78 L    (1.0-4.8)  k/uL


 


VBG pCO2    63 H  (37-51)  mmHg


 


VBG HCO3    39 H  (24-28)  mmol/L


 


Chloride   92 L   ()  mmol/L


 


Carbon Dioxide   38 H   (22-30)  mmol/L


 


Creatinine   0.35 L   (0.52-1.04)  mg/dL


 


Glucose   118 H   (74-99)  mg/dL


 


POC Glucose (mg/dL)     (75-99)  mg/dL


 


Total Protein   6.0 L   (6.3-8.2)  g/dL














  02/18/20 02/18/20 02/19/20 Range/Units





  17:19 20:11 06:58 


 


RBC     (3.80-5.40)  m/uL


 


Hgb     (11.4-16.0)  gm/dL


 


Hct     (34.0-46.0)  %


 


MCV     (80.0-100.0)  fL


 


MCHC     (31.0-37.0)  g/dL


 


Lymphocytes # (Manual)     (1.0-4.8)  k/uL


 


VBG pCO2     (37-51)  mmHg


 


VBG HCO3     (24-28)  mmol/L


 


Chloride     ()  mmol/L


 


Carbon Dioxide     (22-30)  mmol/L


 


Creatinine     (0.52-1.04)  mg/dL


 


Glucose     (74-99)  mg/dL


 


POC Glucose (mg/dL)  144 H  191 H  177 H  (75-99)  mg/dL


 


Total Protein     (6.3-8.2)  g/dL














  02/19/20 Range/Units





  09:08 


 


RBC   (3.80-5.40)  m/uL


 


Hgb   (11.4-16.0)  gm/dL


 


Hct   (34.0-46.0)  %


 


MCV   (80.0-100.0)  fL


 


MCHC   (31.0-37.0)  g/dL


 


Lymphocytes # (Manual)   (1.0-4.8)  k/uL


 


VBG pCO2   (37-51)  mmHg


 


VBG HCO3   (24-28)  mmol/L


 


Chloride  94 L  ()  mmol/L


 


Carbon Dioxide  35 H  (22-30)  mmol/L


 


Creatinine  0.35 L  (0.52-1.04)  mg/dL


 


Glucose  186 H  (74-99)  mg/dL


 


POC Glucose (mg/dL)   (75-99)  mg/dL


 


Total Protein   (6.3-8.2)  g/dL














Assessment and Plan


Assessment: 





59-year-old female with COPD on home oxygen 2-4 L comes in due to worsening 

shortness of breath over the past 1-2 days denies any upper respiratory 

infection like symptoms.  In the ED she was found to be hypoxic and tachycardic 

despite repeated breathing treatments patient didn't make much improvement she 

is admitted for acute COPD exacerbation  





2/19


Patient still having respiratory distress slightly improving with breathing 

treatments she feels tight


Denies any fevers or chills


Denies any nausea vomiting or chest pain


Plan: 





Acute hypoxic hypercapnic respiratory failure secondary to acute COPD 

exacerbation


COPD pathway systemic IV steroids, doxycycline, nebulizers inhalers when 

necessary


CPT


supplemental oxygen as needed


influenza negative


BIpap PRN 10/5


encouraged patient to bring her own cpap





epigastric abd pain 


PPI 


maalox


lipase normal 


GI consult 





DVT PPX sc heparin tid 





chronic anemia macrocytic 


low normal vitamin B12 level


replace with IM vitamin b12


  





patient uses CPAP at home, encouraged her to bring it , she does not know her 

settings

## 2020-02-19 NOTE — P.CNPUL
History of Present Illness


Consult date: 02/19/20


Reason for consult: dyspnea, COPD


History of present illness: 





Lorazepam 9-year-old female patient is well-known to me.  I taking care of her 

in the past.  She has moved from [place] to [place] and she was 

hospitalized for COPD exacerbation back in 2018..  She is a known case of COPD 

and she has chronic hypoxic respiratory failure.  She has had multiple 

hospitalizations in the past for COPD exacerbation and she can recall at least 

hospitalization over the past several years.  She has required steroids due to 

her chronic steroid use she has developed steroids induced cushingoid features. 

She has been on on long-term steroid use and she was taking 5 mg of by mouth 

steroids on a daily basis.  She has been maintained on DuoNeb neb last treatment

around-the-clock and typically she uses it up to 4 times a day.  She is also on 

oxygen and she also has a Incruse modulation day and Pulmicort and inhalers 1 

puff twice a day.   Her previous spirometer was on a pulmonologist office from 

2017 from [place] showed an FEV1 of 19% of predicted.  The patient is not 

smoking cigarettes for now.  She quit smoking in 2013.  She is diabetic.





The patient came into the hospital yesterday for a few days worth of increased 

shortness of breath.  She stated that inhalers and a nebulizer were not helping.

 She was Hospital as for increased shortness of breath.  Apparently she denies 

having any fever or chills.  She denied having any sick contacts.  She has been 

exposed to secondhand smoking through other people will live with her.  The jodie

ent came into the ED.  Chest x-ray showed emphysema without any acute airspace 

disease.  ABGs showed a pH of 7.41 with a pCO2 of 63 and pO2 of 39.  I think 

this was a venous sample.  Her white cell, was at 3.3 with a hemoglobin of 10.3.

 Normal coagulation profile.  The blood work showed metabolic alkalosis 

consistent with chronic hypercapnic respiratory failure.  The last CAT scan of 

the chest was done in June 2019.  The patient back then showed diffuse 

emphysematous change and numerous subcentimeter bilateral pulmonary nodules with

calcification and this was attributed to her previous chickenpox pneumonia.  

There was emphysematous changes bilaterally.  She also had severe degenerative 

disc disease.  For now patient is on a combination of bronchodilators and 

steroids.  She is on a BiPAP at a pressure of 10/5 cm of water with an FiO2 of 

30%.  She is also covered with Zithromax as an empiric antibiotic coverage.  

Outpatient indication of been ordered resume.





Review of Systems








Constitutional


Constitutional: no fever, no night sweats, no significant weight loss, no 

exercise intolerance, weight gain (22lbs) and now she her weight is stable for 

now.





Eyes


Eyes: no dry eyes, no vision change, no irritation





ENMT


Ears: no difficulty hearing, no ear pain


Nose: no frequent nosebleeds, no nose problems, no sinus problems


Mouth/Throat: no sore throat, no bleeding gums, no snoring, no mouth ulcers, no 

teeth problems, dry mouth





Cardiovascular


Cardiovascular: no chest pain, no arm pain on exertion, no shortness of breath 

when lying down, no palpitations, no known heart murmur, shortness of breath 

when walking





Respiratory


Respiratory: Increased shortness of breath





Gastrointestinal


Gastrointestinal: no abdominal pain, no nausea, no vomiting, no constipation, 

normal appetite, no diarrhea, not vomiting blood, no dyspepsia, no GERD





Genitourinary


Genitourinary: no incontinence, no difficulty urinating, no hematuria, no 

increased frequency





Musculoskeletal


Musculoskeletal: no muscle aches, no muscle weakness, no arthralgias/joint pain,

no back pain, no swelling in the extremities





Integumentary


Skin: no abnormal mole, no jaundice, no rashes, no laceration





Neurologic


Neurologic: no loss of consciousness, no weakness, no numbness, no seizures, no 

dizziness, no migraines, no headaches, no tremor





Psychiatric


Psych: no depression, no sleep disturbances, feeling safe in a relationship, no 

alcohol abuse, no anxiety, no hallucinations, no suicidal thoughts





Endocrine


Endocrine: fatigue





Hematologic/Lymphatic


Hematologic/Lymphatic no swollen glands, no bruising, no excessive bleeding





Allergic/Immunologic


Allergy/Immunologic: no runny nose, no sinus pressure, no itching, no hives, no 

frequent sneezing


Screening








Past Medical History


Past Medical History: COPD, Diabetes Mellitus


Additional Past Medical History / Comment(s): DM type II per chart, but pt 

currently denies, "confusion" when CO2 increased per son, herniated disc, neck 

pain


History of Any Multi-Drug Resistant Organisms: None Reported


Additional Past Surgical History / Comment(s): "abdominal surgeries colectomy 

with diverting colostomy and subsequent reversal.  The patient has a large 

anterior abdominal wall incision for now.,


Past Psychological History: Anxiety


Smoking Status: Never smoker


Past Alcohol Use History: None Reported


Past Drug Use History: None Reported





- Past Family History


  ** Family


Family Medical History: No Reported History





Medications and Allergies


                                Home Medications











 Medication  Instructions  Recorded  Confirmed  Type


 


Albuterol Nebulized [Ventolin 2.5 mg INHALATION RT-Q4H PRN 02/18/20 02/18/20 

History





Nebulized]    


 


Albuterol Sulfate [Ventolin HFA] 2 puff INHALATION RT-QID PRN 02/18/20 02/18/20 

History


 


Aspirin EC [Ecotrin Low Dose] 81 mg PO DAILY 02/18/20 02/18/20 History


 


Atenolol [Tenormin] 25 mg PO BID 02/18/20 02/18/20 History


 


Budesonide [Pulmicort Flexhaler] 1 puff INHALATION RT-BID 02/18/20 02/18/20 

History


 


Diclofenac Sodium [Voltaren Gel] 1 applic TOPICAL DAILY PRN 02/18/20 02/18/20 

History


 


Diltiazem HCl [Diltiazem HCl 24Hr 240 mg PO DAILY 02/18/20 02/18/20 History





ER (Cd)]    


 


Escitalopram [Lexapro] 20 mg PO DAILY 02/18/20 02/18/20 History


 


Fluticasone Nasal Spray [Flonase 1 spray EA NOSTRIL BID PRN 02/18/20 02/18/20 

History





Nasal [place]]    


 


HYDROcodone/APAP 10-325MG [Norco 1 tab PO TID PRN 02/18/20 02/18/20 History





]    


 


Ipratropium-Albuterol Nebulize 3 ml INHALATION RT-QID PRN 02/18/20 02/18/20 

History





[Duoneb 0.5 mg-3 mg/3 ml Soln]    


 


LORazepam [Ativan] 0.25 mg PO TID PRN 02/18/20 02/18/20 History


 


Linaclotide [Linzess] 72 mcg PO DAILY 02/18/20 02/18/20 History


 


Montelukast [Singulair] 10 mg PO HS 02/18/20 02/18/20 History


 


Ondansetron Odt [Zofran Odt] 4 mg PO DAILY 02/18/20 02/18/20 History


 


Pantoprazole Sodium [Protonix] 40 mg PO DAILY 02/18/20 02/18/20 History


 


Polyethylene Glycol 3350 [Miralax] 17 gm PO DAILY 02/18/20 02/18/20 History


 


Umeclidinium Bromide [Incruse 2 puff INHALATION RT-DAILY 02/18/20 02/18/20 

History





Ellipta]    


 


glipiZIDE XL [Glucotrol Xl] 2.5 mg PO DAILY 02/18/20 02/18/20 History


 


metFORMIN HCL [Glucophage] 500 mg PO TID 02/18/20 02/18/20 History


 


predniSONE 10 mg PO DAILY 02/18/20 02/18/20 History








                                    Allergies











Allergy/AdvReac Type Severity Reaction Status Date / Time


 


No Known Allergies Allergy   Verified 02/18/20 15:47














Physical Exam


Vitals: 


                                   Vital Signs











  Temp Pulse Pulse Resp BP BP Pulse Ox


 


 02/19/20 12:46    85  18   


 


 02/19/20 12:44  98.0 F   85  18   132/63  99


 


 02/19/20 12:35   80     


 


 02/19/20 12:25   78     


 


 02/19/20 11:34  98.1 F   85  20   129/71  99


 


 02/19/20 09:05   88     


 


 02/19/20 08:55   80     


 


 02/19/20 08:00    95  14   


 


 02/19/20 07:42  97.9 F   95  14   147/81  98


 


 02/19/20 05:00  97.7 F   91  16   157/83  99


 


 02/19/20 04:45   83     


 


 02/19/20 04:36   83     


 


 02/19/20 00:00     18   


 


 02/18/20 20:44   89     


 


 02/18/20 20:31  98 F   90  18   140/65  95


 


 02/18/20 20:30   89      94 L


 


 02/18/20 19:07   101 H   24  146/98   98


 


 02/18/20 16:42   100     


 


 02/18/20 16:31   100     


 


 02/18/20 15:16   116 H   22  158/81   98


 


 02/18/20 13:36   114 H   28 H  151/88   98








                                Intake and Output











 02/18/20 02/19/20 02/19/20





 22:59 06:59 14:59


 


Intake Total 300 660 


 


Balance 300 660 


 


Intake:   


 


  Intake, IV Titration 300 600 





  Amount   


 


    Sodium Chloride 0.9% 1, 300 600 





    000 ml @ 75 mls/hr IV .   





    [id number] STA Rx#:[id number]   


 


  Oral 0 60 


 


Other:   


 


  Voiding Method  Bedside Commode Bedside Commode


 


  # Voids 1 2 

















General Appearance no diaphoresis, no respiratory distress, speech not 

interrupted by breaths, no dyspnea, no pallor, not cachectic, well nourished, 

appears well, obesity the patient is currently wearing a BiPAP at a fullface 

mask and she is quite synchronous with the mechanical ventilator.  She is having

 some difficulties is taken out.  This is.  She is having some limited shortness

 of breath.


HEENT no pursed lip breathing, no jugular venous distention, no mucous membrane 

cyanosis, no perioral cyanosis, mallampati classification: class 1,  (cushingoid

 features related to steroids)


Chest no retractions, no sternocleidomastoid muscle contractions, no 

supraclavicular retractions, no intercostal retractions, no decreased air 

movement, no rhonchi, no hyperinflation, (normal) adventitious sounds: rales / 

crackles: bilaterally: midlung fields, barrel chest, prolonged expiratory 

wheezing, decreased air movement


Heart no right ventricular heave, no distant heart sounds, no s3 gallop, 

(normal) jugular vein: jugular venous distention: by 0cm, (normal) jugular vein


GI bowel sounds: hyperactive (borborygmi), bowel sounds: diminished or absent, 

the patient is an incision over the mid anterior abdominal wall related to 

previous diverticular surgery and diverting colostomy with subsequent reversal.


Extremities no cyanosis, no clubbing, no edema


Neurologic no decreased mental status, no somnolence, no confusion


Assisstive Devices: ambulates with no assitive devices


Gait and Mobility: gait WNL, full weight bearing





Skin General Appearance normal, (normal) normal except as noted





Results





- Laboratory Findings


CBC and BMP: 


                                 02/19/20 09:08





                                 02/19/20 09:08


PT/INR, D-dimer











PT  10.2 sec (9.0-12.0)   02/18/20  11:07    


 


INR  1.0  (<1.2)   02/18/20  11:07    








Abnormal lab findings: 


                                  Abnormal Labs











  02/18/20 02/18/20 02/18/20





  11:07 11:07 11:07


 


WBC   


 


RBC  3.24 L  


 


Hgb  10.1 L  


 


Hct  33.9 L  


 


MCV  104.7 H  


 


MCHC  29.9 L  


 


Lymphocytes #   


 


Lymphocytes # (Manual)  0.78 L  


 


VBG pCO2   


 


VBG HCO3   


 


Chloride   92 L 


 


Carbon Dioxide   38 H 


 


Creatinine   0.35 L 


 


Glucose   118 H 


 


POC Glucose (mg/dL)   


 


Total Protein   6.0 L 


 


RBC Folate    942 H














  02/18/20 02/18/20 02/18/20





  12:06 17:19 20:11


 


WBC   


 


RBC   


 


Hgb   


 


Hct   


 


MCV   


 


MCHC   


 


Lymphocytes #   


 


Lymphocytes # (Manual)   


 


VBG pCO2  63 H  


 


VBG HCO3  39 H  


 


Chloride   


 


Carbon Dioxide   


 


Creatinine   


 


Glucose   


 


POC Glucose (mg/dL)   144 H  191 H


 


Total Protein   


 


RBC Folate   














  02/19/20 02/19/20 02/19/20





  06:58 09:08 09:08


 


WBC   3.3 L 


 


RBC   3.20 L 


 


Hgb   10.3 L 


 


Hct   32.6 L 


 


MCV   101.9 H 


 


MCHC   


 


Lymphocytes #   0.3 L 


 


Lymphocytes # (Manual)   


 


VBG pCO2   


 


VBG HCO3   


 


Chloride    94 L


 


Carbon Dioxide    35 H


 


Creatinine    0.35 L


 


Glucose    186 H


 


POC Glucose (mg/dL)  177 H  


 


Total Protein   


 


RBC Folate   














  02/19/20





  11:07


 


WBC 


 


RBC 


 


Hgb 


 


Hct 


 


MCV 


 


MCHC 


 


Lymphocytes # 


 


Lymphocytes # (Manual) 


 


VBG pCO2 


 


VBG HCO3 


 


Chloride 


 


Carbon Dioxide 


 


Creatinine 


 


Glucose 


 


POC Glucose (mg/dL)  151 H


 


Total Protein 


 


RBC Folate 














- Diagnostic Findings


Chest x-ray: image reviewed





Assessment and Plan


Plan: 








1 acute COPD exacerbation, with secondary shortness of breath.  The patient 

currently on BiPAP at a pressure of 10/5 with an FiO2 of 30%.  She is on 

examination bronchodilators, steroids and empiric antibiotic coverage with 

Zithromax.  No evidence of pneumonia on today's chest x-ray.





2 severe chronic obstructive pulmonary disease- patient obviously has severe 

COPD.  she is currently on a combination of Incruse and Pulmicort.  Previous 

FEV1 as measures and back in 2017 was in order of 17% of predicted and the 

patient has chronic hypoxic respiratory failure and she is oxygen dependent.  

She is an ex-smoker.  She has extensive smoking history.


 


3 history of chickenpox, with secondary pulmonary calcifications 





4 long term systemic steroid user,  prednisone   5 mg every  day as the patient 

has significant side effects, cushingoid features and weight gain.





5 diabetes mellitus





6 history of multiple bowel surgeries with colectomy and diverting colostomy 

with subsequent reversal





7 chronic hypercapnic respiratory failure with secondary metabolic alkalosis





8 chronic hypoxic respiratory failure





Plan





Continue DuoNeb neb last treatment around-the-clock


IV Solu-Medrol


Perforomist Pulmicort nebulized treatments twice a day


Consider a home ventilator due to her recurrent COPD exacerbation and chronic 

hypoxic/hypercapnic respiratory failure


Empiric antibiotic coverage and Zithromax


We'll continue to follow.

## 2020-02-19 NOTE — CONS
CONSULTATION



DATE OF DICTATION:

02/19/2020



REASON FOR CONSULT:

Epigastric pain.



HISTORY OF PRESENT ILLNESS:

The patient is a 59-year-old pleasant white female with advanced COPD, on home oxygen,

admitted to the hospital because of worsening shortness of breath for the last few

weeks' duration. The reason we are consulted is because of persistent epigastric and

upper abdominal pain for the last 3 years' duration.  The patient is known to me from

office visits.  She has been complaining of severe upper abdominal pain, more like a

tightness, and cannot breathe, for the last 2 years' duration.  She has been

extensively investigated on an outpatient basis by me.  She had multiple upper

endoscopies, a gastric emptying scan, small bowel series, ultrasound of the

gallbladder, CT of the abdomen over the course of the last 6 months, which were all

negative.  In fact, her last upper endoscopy was only a month ago at _____ [facility], that showed mild gastritis.  She was treated with various proton pump

inhibitors, antispasmodics, with no relief.



PAST MEDICAL HISTORY:

Her past medical history is significant for COPD, on home oxygen, diabetes mellitus,

hypercholesterolemia, chronic constipation, degenerative joint disease.



MEDICATIONS:

Medications at home: Albuterol, Ecotrin, Tenormin, Pulmicort, Voltaren, Lexapro,

Linzess, DuoNeb, Ativan, Singulair, Zofran, Protonix, MiraLAX, Ellipta, Glucotrol,

Glucophage and prednisone.



ALLERGIES:

NONE.



SOCIAL HISTORY:

No smoking.  No alcohol use.



FAMILY HISTORY:

Unremarkable.



REVIEW OF SYSTEMS:

CARDIOPULMONARY: Complains of severe shortness of breath.

GENITOURINARY: No dysuria or hematuria.

MUSCULOSKELETAL: Unremarkable.

SKIN: Unremarkable.

ENDOCRINE: Unremarkable.

PSYCHIATRIC: Anxiety, depression.

NEUROLOGY: Unremarkable.

ENT/VISION: Unremarkable.

CONSTITUTIONAL: No weight loss. No fever, chills, night sweats.



PHYSICAL EXAMINATION:

She appears somewhat in distress because of shortness of breath.

VITAL SIGNS:  Blood pressure 132/63, pulse rate 85, temperature 98.

HEENT examination unremarkable. Conjunctivae pink. Sclerae anicteric. Oral cavity no

lesions.

NECK: No JVD or lymph node enlargement.

CHEST: Decreased breath sounds bilaterally.

HEART:  Regular rate and rhythm.

ABDOMEN:  Soft.  Bowel sounds are positive.

EXTREMITIES: No pedal edema.

SKIN: No rashes.

NEUROLOGIC:  Alert and oriented x3.  No focal deficits.



LABS:

Labs from today show WBC 3.3, hemoglobin 10.3, platelets normal.  Basic metabolic panel

is within normal limits except CO2 is 35.



IMPRESSION:

1. Chronic epigastric pain for the last 2 years' duration.  Extensive investigations

    on an outpatient basis, including an EGD, ultrasound of the abdomen, CT of the

    abdomen, small bowel series, all negative.  It is likely her upper abdominal pain

    could be related to excessive use of accessory muscles from advanced COPD.

2. Advanced chronic obstructive pulmonary disease, on home oxygen and presently on

    BiPAP.

3. History of hypertension and hyperlipidemia.

4. Chronic constipation.



RECOMMENDATIONS:

1. Will obtain HIDA scan during this hospitalization.

2. Continue with Protonix 40 mg twice daily.

3. Regular diet.

4. Further recommendations will follow based on the HIDA scan results.

Thank you for this consultation.





MMODL / GEORGESN: [id number] / Job#: [id number]

## 2020-02-19 NOTE — NM
EXAMINATION TYPE: NM hepatobiliary w CCK

 

DATE OF EXAM: 2/19/2020

 

COMPARISON: NONE

 

HISTORY:

 

TECHNIQUE: After the intravenous administration of 5.2 mCi Tc 99m Mebrofenin hepatobiliary scintigrap
hy is performed.  Immediate images post injection.

 

FINDINGS: 

There is prompt uptake of the tracer by the liver that has normal size. Liver has a somewhat lobulate
d appearance. There is no evidence of a defect within the liver. There is prompt uptake by the gallbl
adder at 15 minutes. There is tracer in the small bowel at 60 minutes.

 

The patient was injected with the 1 mcg of Kinevac and additional images obtained. The gallbladder ej
ection fraction is 92%.

 

IMPRESSION:

Normal hepatobiliary scan. Normal gallbladder ejection fraction.

## 2020-02-20 LAB
GLUCOSE BLD-MCNC: 149 MG/DL (ref 75–99)
GLUCOSE BLD-MCNC: 169 MG/DL (ref 75–99)
GLUCOSE BLD-MCNC: 208 MG/DL (ref 75–99)
GLUCOSE BLD-MCNC: 246 MG/DL (ref 75–99)

## 2020-02-20 RX ADMIN — HEPARIN SODIUM SCH UNIT: 5000 INJECTION, SOLUTION INTRAVENOUS; SUBCUTANEOUS at 23:16

## 2020-02-20 RX ADMIN — IPRATROPIUM BROMIDE SCH: 0.5 SOLUTION RESPIRATORY (INHALATION) at 19:52

## 2020-02-20 RX ADMIN — MONTELUKAST SODIUM SCH MG: 10 TABLET, FILM COATED ORAL at 21:14

## 2020-02-20 RX ADMIN — HYDROCODONE BITARTRATE AND ACETAMINOPHEN PRN EACH: 10; 325 TABLET ORAL at 04:09

## 2020-02-20 RX ADMIN — DILTIAZEM HYDROCHLORIDE SCH MG: 240 CAPSULE, COATED, EXTENDED RELEASE ORAL at 09:00

## 2020-02-20 RX ADMIN — AZITHROMYCIN SCH MG: 250 TABLET, FILM COATED ORAL at 09:00

## 2020-02-20 RX ADMIN — IPRATROPIUM BROMIDE SCH MG: 0.5 SOLUTION RESPIRATORY (INHALATION) at 11:33

## 2020-02-20 RX ADMIN — FORMOTEROL FUMARATE DIHYDRATE SCH MCG: 20 SOLUTION RESPIRATORY (INHALATION) at 07:32

## 2020-02-20 RX ADMIN — POLYETHYLENE GLYCOL 3350 SCH: 17 POWDER, FOR SOLUTION ORAL at 09:00

## 2020-02-20 RX ADMIN — ATENOLOL SCH MG: 25 TABLET ORAL at 21:14

## 2020-02-20 RX ADMIN — FORMOTEROL FUMARATE DIHYDRATE SCH MCG: 20 SOLUTION RESPIRATORY (INHALATION) at 19:48

## 2020-02-20 RX ADMIN — INSULIN ASPART SCH UNIT: 100 INJECTION, SOLUTION INTRAVENOUS; SUBCUTANEOUS at 18:25

## 2020-02-20 RX ADMIN — INSULIN ASPART SCH UNIT: 100 INJECTION, SOLUTION INTRAVENOUS; SUBCUTANEOUS at 08:59

## 2020-02-20 RX ADMIN — METHYLPREDNISOLONE SODIUM SUCCINATE SCH MG: 125 INJECTION, POWDER, FOR SOLUTION INTRAMUSCULAR; INTRAVENOUS at 08:58

## 2020-02-20 RX ADMIN — ASPIRIN SCH: 325 TABLET ORAL at 09:00

## 2020-02-20 RX ADMIN — HEPARIN SODIUM SCH UNIT: 5000 INJECTION, SOLUTION INTRAVENOUS; SUBCUTANEOUS at 16:28

## 2020-02-20 RX ADMIN — PANTOPRAZOLE SODIUM SCH MG: 40 TABLET, DELAYED RELEASE ORAL at 08:58

## 2020-02-20 RX ADMIN — METHYLPREDNISOLONE SODIUM SUCCINATE SCH MG: 125 INJECTION, POWDER, FOR SOLUTION INTRAMUSCULAR; INTRAVENOUS at 05:07

## 2020-02-20 RX ADMIN — HYDROCODONE BITARTRATE AND ACETAMINOPHEN PRN EACH: 10; 325 TABLET ORAL at 16:28

## 2020-02-20 RX ADMIN — INSULIN ASPART SCH UNIT: 100 INJECTION, SOLUTION INTRAVENOUS; SUBCUTANEOUS at 21:14

## 2020-02-20 RX ADMIN — ATENOLOL SCH MG: 25 TABLET ORAL at 08:58

## 2020-02-20 RX ADMIN — IPRATROPIUM BROMIDE AND ALBUTEROL SULFATE PRN ML: .5; 3 SOLUTION RESPIRATORY (INHALATION) at 19:48

## 2020-02-20 RX ADMIN — IPRATROPIUM BROMIDE SCH MG: 0.5 SOLUTION RESPIRATORY (INHALATION) at 17:23

## 2020-02-20 RX ADMIN — INSULIN ASPART SCH UNIT: 100 INJECTION, SOLUTION INTRAVENOUS; SUBCUTANEOUS at 13:14

## 2020-02-20 RX ADMIN — HEPARIN SODIUM SCH UNIT: 5000 INJECTION, SOLUTION INTRAVENOUS; SUBCUTANEOUS at 08:59

## 2020-02-20 RX ADMIN — BUDESONIDE SCH MG: 1 SUSPENSION RESPIRATORY (INHALATION) at 07:32

## 2020-02-20 RX ADMIN — IPRATROPIUM BROMIDE SCH: 0.5 SOLUTION RESPIRATORY (INHALATION) at 07:36

## 2020-02-20 RX ADMIN — BUDESONIDE SCH MG: 1 SUSPENSION RESPIRATORY (INHALATION) at 19:48

## 2020-02-20 RX ADMIN — CYANOCOBALAMIN SCH MCG: 1000 INJECTION, SOLUTION INTRAMUSCULAR; SUBCUTANEOUS at 13:17

## 2020-02-20 RX ADMIN — IPRATROPIUM BROMIDE AND ALBUTEROL SULFATE PRN ML: .5; 3 SOLUTION RESPIRATORY (INHALATION) at 07:32

## 2020-02-20 RX ADMIN — ESCITALOPRAM OXALATE SCH MG: 20 TABLET, FILM COATED ORAL at 09:00

## 2020-02-20 RX ADMIN — PANTOPRAZOLE SODIUM SCH MG: 40 TABLET, DELAYED RELEASE ORAL at 16:28

## 2020-02-20 NOTE — P.PN
Subjective


Progress Note Date: 02/20/20


Principal diagnosis: 





follow up for acute COPD exacerbation 





Patient seen and examined she feels her breathing is finally improving , denies 

any chest pain , tolerating PO intake 





Objective





- Vital Signs


Vital signs: 


                                   Vital Signs











Temp  98.2 F   02/20/20 12:07


 


Pulse  78   02/20/20 12:07


 


Resp  20   02/20/20 12:07


 


BP  130/75   02/20/20 12:07


 


Pulse Ox  99   02/20/20 12:07








                                 Intake & Output











 02/19/20 02/20/20 02/20/20





 18:59 06:59 18:59


 


Intake Total 600  


 


Balance 600  


 


Intake:   


 


  Intake, IV Titration 600  





  Amount   


 


    Sodium Chloride 0.9% 1, 600  





    000 ml @ 75 mls/hr IV .   





    [id number] STA Rx#:[id number]   


 


Other:   


 


  Voiding Method Bedside Commode Bedside Commode Bedside Commode


 


  # Voids 2 1 














- Exam





Constitutional:   vital signs stable, Not in acute distress, pleasant, 

conversant 


Lungs: Diminished breath sounds throughout however slight improvement compared 

to yesterday , not using accessory muscles of respiration 


Cardiovascular: Regular rate and rhythm, no murmurs, no gallops, no rubs, no 

peripheral edema


Gastrointestinal: Soft, no tenderness to palpation,  bowel sounds positive 


Extremities: No digital cyanosis or clubbing, peripheral pulses palpable and 

equal ,  no calf muscle tenderness


Psych: Alert, oriented to place, person and time, appropriate affect, intact 

judgment  








- Labs


CBC & Chem 7: 


                                 02/19/20 09:08





                                 02/19/20 09:08


Labs: 


                  Abnormal Lab Results - Last 24 Hours (Table)











  02/19/20 02/19/20 02/20/20 Range/Units





  18:12 19:54 07:04 


 


POC Glucose (mg/dL)  175 H  160 H  149 H  (75-99)  mg/dL














  02/20/20 Range/Units





  11:23 


 


POC Glucose (mg/dL)  169 H  (75-99)  mg/dL








                      Microbiology - Last 24 Hours (Table)











 02/18/20 15:05 Blood Culture - Preliminary





 Blood    No Growth after 24 hours














Assessment and Plan


Assessment: 





59-year-old female with COPD on home oxygen 2-4 L comes in due to worsening 

shortness of breath over the past 1-2 days denies any upper respiratory 

infection like symptoms.  In the ED she was found to be hypoxic and tachycardic 

despite repeated breathing treatments patient didn't make much improvement she 

is admitted for acute COPD exacerbation  





2/19


Patient still having respiratory distress slightly improving with breathing 

treatments she feels tight


Denies any fevers or chills


Denies any nausea vomiting or chest pain





2/20 


today she is showing some improvement in her breathing 


pulmonary planning on arranging for her to get trilogy 


discharge planning in AM


hepatobilliary scan wnl





Plan: 





Acute hypoxic hypercapnic respiratory failure secondary to acute COPD 

exacerbation, improving 


COPD pathway systemic   steroids, doxycycline, nebulizers inhalers when 

necessary


CPT


supplemental oxygen as needed


influenza negative


BIpap PRN 10/5


switch to PO prednisone


 


epigastric abd pain 


PPI 


maalox


lipase normal 


GI consult 


hida scan wnl





DVT PPX sc heparin tid 





chronic anemia macrocytic 


low normal vitamin B12 level


replace with IM vitamin b12


  





 pulmonary recommending trilogy ventilator at home 





discharge planning in AM

## 2020-02-20 NOTE — P.PN
Subjective


Progress Note Date: 02/20/20








59-year-old female patient is well-known to me.  I taking care of her in the 

past.  She has moved from [place] to [place] and she was hospitalized for 

COPD exacerbation back in 2018..  She is a known case of COPD and she has 

chronic hypoxic respiratory failure.  She has had multiple hospitalizations in 

the past for COPD exacerbation and she can recall at least hospitalization over 

the past several years.  She has required steroids due to her chronic steroid 

use she has developed steroids induced cushingoid features.  She has been on on 

long-term steroid use and she was taking 5 mg of by mouth steroids on a daily 

basis.  She has been maintained on DuoNeb neb last treatment around-the-clock an

d typically she uses it up to 4 times a day.  She is also on oxygen and she also

has a Incruse modulation day and Pulmicort and inhalers 1 puff twice a day.   

Her previous spirometer was on a pulmonologist office from 2017 from [place]

showed an FEV1 of 19% of predicted.  The patient is not smoking cigarettes for 

now.  She quit smoking in 2013.  She is diabetic.





The patient came into the hospital yesterday for a few days worth of increased 

shortness of breath.  She stated that inhalers and a nebulizer were not helping.

 She was Hospital as for increased shortness of breath.  Apparently she denies 

having any fever or chills.  She denied having any sick contacts.  She has been 

exposed to secondhand smoking through other people will live with her.  The 

patient came into the ED.  Chest x-ray showed emphysema without any acute 

airspace disease.  ABGs showed a pH of 7.41 with a pCO2 of 63 and pO2 of 39.  I 

think this was a venous sample.  Her white cell, was at 3.3 with a hemoglobin of

10.3.  Normal coagulation profile.  The blood work showed metabolic alkalosis 

consistent with chronic hypercapnic respiratory failure.  The last CAT scan of 

the chest was done in June 2019.  The patient back then showed diffuse 

emphysematous change and numerous subcentimeter bilateral pulmonary nodules with

calcification and this was attributed to her previous chickenpox pneumonia.  

There was emphysematous changes bilaterally.  She also had severe degenerative 

disc disease.  For now patient is on a combination of bronchodilators and 

steroids.  She is on a BiPAP at a pressure of 10/5 cm of water with an FiO2 of 

30%.  She is also covered with Zithromax as an empiric antibiotic coverage.  

Outpatient indication of been ordered resume.





On today's evaluation of 02/20/2020 the patient is feeling better.  She is less 

short of breath.  She was given BiPAP throughout the night and currently she is 

on 4 L of oxygen by nasal cannula.  Her speech is improved.  She is not 

struggling to breathe.  She is able to speak Full sentences.  No chest pain.  

She is an ex-smoker and she quit smoking in 2013.  She has chronic hypoxic and 

hypercapnic respiratory failure.  No other new issues for now.  No altered 

mentation.  She remains on IV Solu-Medrol.  She remains on a combination of 

Perforomist and Pulmicort neb last 2 minutes twice a day.  She is on Zithromax.





Objective





- Vital Signs


Vital signs: 


                                   Vital Signs











Temp  98.2 F   02/20/20 12:07


 


Pulse  78   02/20/20 12:07


 


Resp  20   02/20/20 12:07


 


BP  130/75   02/20/20 12:07


 


Pulse Ox  99   02/20/20 12:07








                                 Intake & Output











 02/19/20 02/20/20 02/20/20





 18:59 06:59 18:59


 


Intake Total 600  


 


Balance 600  


 


Intake:   


 


  Intake, IV Titration 600  





  Amount   


 


    Sodium Chloride 0.9% 1, 600  





    000 ml @ 75 mls/hr IV .   





    [id number] STA Rx#:[id number]   


 


Other:   


 


  Voiding Method Bedside Commode Bedside Commode Bedside Commode


 


  # Voids 2 1 














- Exam








General Appearance no diaphoresis, no respiratory distress, speech not 

interrupted by breaths, no dyspnea, no pallor, not cachectic, well nourished, 

appears well, obesity the patient is 4 L of oxygen by nasal cannula.   She is 

having some limited shortness of breath.  Her pulse ox is noted of 99%.  She is 

not using accessory muscles of breathing.


HEENT no pursed lip breathing, no jugular venous distention, no mucous membrane 

cyanosis, no perioral cyanosis, mallampati classification: class 1,  (cushingoid

features related to steroids)


Chest no retractions, no sternocleidomastoid muscle contractions, no 

supraclavicular retractions, no intercostal retractions, no decreased air 

movement, no rhonchi, no hyperinflation, (normal) adventitious sounds: rales / 

crackles: bilaterally: midlung fields, barrel chest, prolonged expiratory 

wheezing, decreased air movement


Heart no right ventricular heave, no distant heart sounds, no s3 gallop, 

(normal) jugular vein: jugular venous distention: by 0cm, (normal) jugular vein


GI bowel sounds: hyperactive (borborygmi), bowel sounds: diminished or absent, 

the patient is an incision over the mid anterior abdominal wall related to 

previous diverticular surgery and diverting colostomy with subsequent reversal.


Extremities no cyanosis, no clubbing, no edema


Neurologic no decreased mental status, no somnolence, no confusion


Assisstive Devices: ambulates with no assitive devices


Gait and Mobility: gait WNL, full weight bearing





Skin General Appearance normal, (normal) normal except as noted





- Labs


CBC & Chem 7: 


                                 02/19/20 09:08





                                 02/19/20 09:08


Labs: 


                  Abnormal Lab Results - Last 24 Hours (Table)











  02/19/20 02/19/20 02/20/20 Range/Units





  18:12 19:54 07:04 


 


POC Glucose (mg/dL)  175 H  160 H  149 H  (75-99)  mg/dL














  02/20/20 Range/Units





  11:23 


 


POC Glucose (mg/dL)  169 H  (75-99)  mg/dL








                      Microbiology - Last 24 Hours (Table)











 02/18/20 15:05 Blood Culture - Preliminary





 Blood    No Growth after 24 hours














Assessment and Plan


Plan: 








1 acute COPD exacerbation, with secondary shortness of breath.   The patient was

optimized with a combination of bronchodilators and steroids and antibiotics and

BiPAP for respiratory support and currently she is on 40 selection by nasal 

cannula.  She is feeling better.


2 severe chronic obstructive pulmonary disease- patient obviously has severe 

COPD.  she is currently on a combination of Incruse and Pulmicort.  Previous 

FEV1 as measures and back in 2017 was in order of 17% of predicted and the 

patient has chronic hypoxic respiratory failure and she is oxygen dependent.  

She is an ex-smoker.  She has extensive smoking history.


 


3 history of chickenpox, with secondary pulmonary calcifications 





4 long term systemic steroid user,  prednisone   5 mg every  day as the patient 

has significant side effects, cushingoid features and weight gain.





5 diabetes mellitus





6 history of multiple bowel surgeries with colectomy and diverting colostomy 

with subsequent reversal





7 chronic hypercapnic respiratory failure with secondary metabolic alkalosis





8 chronic hypoxic respiratory failure





Plan





Continue DuoNeb neb last treatment around-the-clock


IV Solu-Medrol


Perforomist Pulmicort nebulized treatments twice a day


Consider a home ventilator due to her recurrent COPD exacerbation and chronic 

hypoxic/hypercapnic respiratory failure


Empiric antibiotic coverage and Zithromax


We'll continue using BiPAP on and off during the day for any shortness of 

breath.  Would suggest continuing using it at nighttime.


Currently on 4 L of oxygen by nasal cannula 


HIDA scan was noted and it was within normal limits


We'll continue to follow.


.

## 2020-02-20 NOTE — PN
PROGRESS NOTE



DATE OF SERVICE:

02/20/2020



The patient is a 59-year-old white female admitted to the hospital with exacerbation of

chronic obstructive pulmonary disease/pneumonia, being followed by Dr. Coronel.  She

has been complaining of upper abdominal pain for the last two years duration.  She had

extensive workup as an outpatient which was negative.  She was scheduled for a HIDA

scan yesterday, which was also reported as normal.  In the meantime, she is feeling

somewhat better.  She remains on Protonix 40 mg twice daily.  Her oral intake has been

improving.  No nausea or vomiting.  She still has some epigastric discomfort.



PHYSICAL EXAMINATION:

She appears comfortable.  No apparent distress.

VITAL SIGNS:  Stable.  Blood pressure is 130/75, heart rate 78, temperature 98.2.

HEENT:  Examination unremarkable.  Conjunctivae pink.  Sclerae anicteric.  Oral cavity,

no lesions.

NECK:  No JVD or lymph node enlargement.

CHEST:  Clear to auscultation except for decreased breath sounds bilaterally.

ABDOMEN:  Bowel sounds are positive.  No organomegaly.

EXTREMITIES:  No pedal edema.

NEUROLOGIC:  She is alert and oriented x3.  No focal deficits.



LABS:

Include WBC 3.3, hemoglobin 10.3, platelets normal.  Basic metabolic panel is within

normal limits.



IMPRESSION:

1. Upper abdominal pain, presently on Protonix 40 mg twice daily, doing better.

    Extensive workup over the last six months was all negative.  Recent HIDA scan was

    also negative.

2. Exacerbation of chronic obstructive pulmonary disease/pneumonia, on antibiotics,

    doing better.



RECOMMENDATIONS:

1. Continue with Protonix 40 twice daily.

2. Small frequent meals.

3. Will sign off.  Please call as needed.



Thank you for this consultation.





MMODL / IJN: [id number] / Job#: [id number]

## 2020-02-21 VITALS
SYSTOLIC BLOOD PRESSURE: 130 MMHG | HEART RATE: 72 BPM | DIASTOLIC BLOOD PRESSURE: 76 MMHG | TEMPERATURE: 98 F | RESPIRATION RATE: 20 BRPM

## 2020-02-21 LAB
GLUCOSE BLD-MCNC: 119 MG/DL (ref 75–99)
GLUCOSE BLD-MCNC: 211 MG/DL (ref 75–99)

## 2020-02-21 RX ADMIN — DILTIAZEM HYDROCHLORIDE SCH MG: 240 CAPSULE, COATED, EXTENDED RELEASE ORAL at 08:34

## 2020-02-21 RX ADMIN — IPRATROPIUM BROMIDE SCH MG: 0.5 SOLUTION RESPIRATORY (INHALATION) at 16:30

## 2020-02-21 RX ADMIN — ESCITALOPRAM OXALATE SCH MG: 20 TABLET, FILM COATED ORAL at 08:34

## 2020-02-21 RX ADMIN — BUDESONIDE SCH MG: 1 SUSPENSION RESPIRATORY (INHALATION) at 07:34

## 2020-02-21 RX ADMIN — IPRATROPIUM BROMIDE AND ALBUTEROL SULFATE PRN ML: .5; 3 SOLUTION RESPIRATORY (INHALATION) at 11:30

## 2020-02-21 RX ADMIN — CYANOCOBALAMIN SCH MCG: 1000 INJECTION, SOLUTION INTRAMUSCULAR; SUBCUTANEOUS at 08:35

## 2020-02-21 RX ADMIN — POLYETHYLENE GLYCOL 3350 SCH GM: 17 POWDER, FOR SOLUTION ORAL at 08:34

## 2020-02-21 RX ADMIN — PANTOPRAZOLE SODIUM SCH MG: 40 TABLET, DELAYED RELEASE ORAL at 07:28

## 2020-02-21 RX ADMIN — ASPIRIN SCH: 325 TABLET ORAL at 08:47

## 2020-02-21 RX ADMIN — BUDESONIDE SCH MG: 1 SUSPENSION RESPIRATORY (INHALATION) at 16:30

## 2020-02-21 RX ADMIN — IPRATROPIUM BROMIDE SCH: 0.5 SOLUTION RESPIRATORY (INHALATION) at 11:31

## 2020-02-21 RX ADMIN — HYDROCODONE BITARTRATE AND ACETAMINOPHEN PRN EACH: 10; 325 TABLET ORAL at 16:45

## 2020-02-21 RX ADMIN — HYDROCODONE BITARTRATE AND ACETAMINOPHEN PRN EACH: 10; 325 TABLET ORAL at 00:31

## 2020-02-21 RX ADMIN — IPRATROPIUM BROMIDE AND ALBUTEROL SULFATE PRN ML: .5; 3 SOLUTION RESPIRATORY (INHALATION) at 07:34

## 2020-02-21 RX ADMIN — FORMOTEROL FUMARATE DIHYDRATE SCH MCG: 20 SOLUTION RESPIRATORY (INHALATION) at 16:30

## 2020-02-21 RX ADMIN — INSULIN ASPART SCH UNIT: 100 INJECTION, SOLUTION INTRAVENOUS; SUBCUTANEOUS at 12:06

## 2020-02-21 RX ADMIN — INSULIN ASPART SCH: 100 INJECTION, SOLUTION INTRAVENOUS; SUBCUTANEOUS at 07:10

## 2020-02-21 RX ADMIN — HYDROCODONE BITARTRATE AND ACETAMINOPHEN PRN EACH: 10; 325 TABLET ORAL at 08:35

## 2020-02-21 RX ADMIN — ATENOLOL SCH MG: 25 TABLET ORAL at 08:35

## 2020-02-21 RX ADMIN — IPRATROPIUM BROMIDE SCH: 0.5 SOLUTION RESPIRATORY (INHALATION) at 07:35

## 2020-02-21 RX ADMIN — HEPARIN SODIUM SCH: 5000 INJECTION, SOLUTION INTRAVENOUS; SUBCUTANEOUS at 14:27

## 2020-02-21 RX ADMIN — AZITHROMYCIN SCH MG: 250 TABLET, FILM COATED ORAL at 08:34

## 2020-02-21 RX ADMIN — HEPARIN SODIUM SCH UNIT: 5000 INJECTION, SOLUTION INTRAVENOUS; SUBCUTANEOUS at 07:28

## 2020-02-21 RX ADMIN — FORMOTEROL FUMARATE DIHYDRATE SCH MCG: 20 SOLUTION RESPIRATORY (INHALATION) at 07:34

## 2020-02-21 NOTE — P.PN
Subjective


Progress Note Date: 02/21/20


 On 02/21/2020 patient seen in follow-up on medical surgical floor.  Breathing 

is improved, patient is tolerating ambulation, still has exertional dyspnea she 

overall has improved, she is on her home dose O2 at 4 L with a pulse ox of 91-

95%.  Afebrile.  Hemodynamically stable, lung sounds are diminished, no rhonchi,

no wheezes heard.  Blood cultures have been negative, no fever or chills.  

Patient has been transitioned to oral prednisone, she is on Zithromax, nebulized

bronchodilators.  Patient does have a home BiPAP device she is unsure of the 

settings but she has been compliant in wearing it every night at home.  She is 

being discharged home today, with outpatient follow-up in the office. 








Objective





- Vital Signs


Vital signs: 


                                   Vital Signs











Temp  98 F   02/21/20 11:41


 


Pulse  72   02/21/20 11:41


 


Resp  20   02/21/20 11:41


 


BP  130/76   02/21/20 11:41


 


Pulse Ox  99   02/21/20 11:41








                                 Intake & Output











 02/20/20 02/21/20 02/21/20





 18:59 06:59 18:59


 


Intake Total 100 590 


 


Balance 100 590 


 


Intake:   


 


    


 


    100 100  


 


  Oral  590 


 


Other:   


 


  Voiding Method Bedside Commode Bedside Commode Bedside Commode


 


  # Voids 1 2 














- Exam


 GENERAL EXAM: Alert, very pleasant, 59-year-old white female, currently on 4 L 

of oxygen with a pulse ox of 91% wearing BiPAP support at night with pressures 

of 10/5 and FiO2 of 30% comfortable in no apparent distress.


HEAD: Normocephalic/atraumatic.


EYES: Normal reaction of pupils, equal size.  Conjunctiva pink, sclera white.


NOSE: Clear with pink turbinates.


THROAT: No erythema or exudates.


NECK: No masses, no JVD, no thyroid enlargement, no adenopathy.


CHEST: No chest wall deformity.  Symmetrical expansion. 


LUNGS: Diminished air entry with no crackles, wheeze, rhonchi or dullness.


CVS: Regular rate and rhythm, normal S1 and S2, no gallops, no murmurs, no rubs


ABDOMEN: Soft, nontender.  No hepatosplenomegaly, normal bowel sounds, no 

guarding or rigidity.


EXTREMITIES: No clubbing, no edema, no cyanosis, 2+ pulses and upper and lower 

extremities.


MUSCULOSKELETAL: Muscle strength and tone normal.


SPINE: No scoliosis or deformity


SKIN: No rashes


CENTRAL NERVOUS SYSTEM: Alert and oriented -3.  No focal deficits, tone is 

normal in all 4 extremities.


PSYCHIATRIC: Alert and oriented -3.  Appropriate affect.  Intact judgment and 

insight.











- Labs


CBC & Chem 7: 


                                 02/19/20 09:08





                                 02/19/20 09:08


Labs: 


                  Abnormal Lab Results - Last 24 Hours (Table)











  02/20/20 02/20/20 02/21/20 Range/Units





  16:47 20:08 07:06 


 


POC Glucose (mg/dL)  246 H  208 H  119 H  (75-99)  mg/dL














  02/21/20 Range/Units





  11:09 


 


POC Glucose (mg/dL)  211 H  (75-99)  mg/dL








                      Microbiology - Last 24 Hours (Table)











 02/18/20 15:05 Blood Culture - Preliminary





 Blood    No Growth after 48 hours














Assessment and Plan


Plan: 


 Assessment:





1 acute COPD exacerbation, with secondary shortness of breath.   The patient was

optimized with a combination of bronchodilators and steroids and antibiotics and

BiPAP for respiratory support and currently she is on 4 liters of oxygen by 

nasal cannula.  She is feeling better.





2 severe chronic obstructive pulmonary disease- patient obviously has severe 

COPD.  she is currently on a combination of Incruse and Pulmicort.  Previous 

FEV1 as measures and back in 2017 was in order of 17% of predicted and the 

patient has chronic hypoxic respiratory failure and she is oxygen dependent.  

She is an ex-smoker.  She has extensive smoking history.


 


3 history of chickenpox, with secondary pulmonary calcifications 





4 long term systemic steroid user,  prednisone   5 mg every  day as the patient 

has significant side effects, cushingoid features and weight gain.





5 diabetes mellitus





6 history of multiple bowel surgeries with colectomy and diverting colostomy 

with subsequent reversal





7 chronic hypercapnic respiratory failure with secondary metabolic alkalosis, 

patient does have a BiPAP at home with settings of 12/5 and FiO2 of 30%





8 chronic hypoxic respiratory failure





Plan:





From pulmonary perspective patient is stable for discharge home today, she does 

have a BiPAP device at home with settings of 12/5 and FiO2 of 30% which she has 

been compliant with, patient can resume her maintenance inhalers and nebulized 

treatments, going home on prednisone taper she can finish outpatient course of 

antibiotics, follow up with Dr. Coronel in the office.  Further discussion 

about possibility of trilogy ventilator will be done on an outpatient basis 

during follow-up appointment





I performed a history & physical examination of the patient and discussed their 

management with my nurse practitioner, Tianna Camp.  I reviewed the nurse 

practitioner's note and agree with the documented findings and plan of care.  

Lung sounds are positive for diminished breath sounds.  The findings and the 

impression was discussed with the patient.  I attest to the documentation by the

nurse practitioner. 





Time with Patient: Less than 30

## 2020-02-21 NOTE — P.DS
Providers


Date of admission: 


02/18/20 13:31





Attending physician: 


Kingsley Mosquera MD





Consults: 





                                        





02/19/20 10:07


Consult Physician Routine 


   Consulting Provider: Elizabeth Coronel


   Consult Reason/Comments: copd


   Do you want consulting provider notified?: Yes











Primary care physician: 


Kosta Ledesma MD





Hospital Course: 





final diagnosis at discharge


Acute hypoxic hypercapnic respiratory failure secondary to acute COPD 

exacerbation,


end stage severe COPD  


chronic hypoxic respiratory failure on 4 LPM 


irritable bowel syndrome


chronic macrocytic anemia











hospital course


59-year-old female with COPD on home oxygen 2-4 L comes in due to worsening 

shortness of breath over the past 1-2 days denies any upper respiratory 

infection like symptoms.  In the ED she was found to be hypoxic and tachycardic 

despite repeated breathing treatments patient didn't make much improvement she 

is admitted for acute COPD exacerbation  





2/19


Patient still having respiratory distress slightly improving with breathing 

treatments she feels tight


Denies any fevers or chills


Denies any nausea vomiting or chest pain





2/20 


today she is showing some improvement in her breathing 


pulmonary planning on arranging for her to get trilogy 


discharge planning in 


hepatobilliary scan [facility]





2/21 


patient seen  and examined on day of discharge


ambulatory Oxygen sat maintained above 91% with 6 min walk while on 4 LPM 


tolerated excercise well


no new complaints , no chest pain , no trouble breathing 





Constitutional:   vital signs stable, Not in acute distress, pleasant, 

conversant 


Lungs: Diminished breath sounds throughout no wheezing rhonchi or rales, not 

using accessory muscles of respiration 


Cardiovascular: Regular rate and rhythm, no murmurs, no gallops, no rubs, no 

peripheral edema 


Extremities:  no calf muscle tenderness


Psych: Alert, oriented to place, person and time, appropriate affect, intact 

judgment  








follow up with pulmonary , who recommended getting a ventilator , patient 

currently has bipap


follow up with PCP


discharge in stable condition 


tapering dose of prednisone


40 minutes were spent discharging this patient, and more than 50% of the time 

was spent in counseling the patient and family and in coordinating care.


Patient Condition at Discharge: Stable





Plan - Discharge Summary


New Discharge Prescriptions: 


No Action


   Albuterol Nebulized [Ventolin Nebulized] 2.5 mg INHALATION RT-Q4H PRN


     PRN Reason: Shortness Of Breath


   Ipratropium-Albuterol Nebulize [Duoneb 0.5 mg-3 mg/3 ml Soln] 3 ml INHALATION

RT-QID PRN


     PRN Reason: Shortness Of Breath


   Budesonide [Pulmicort Flexhaler] 1 puff INHALATION RT-BID


   Albuterol Sulfate [Ventolin HFA] 2 puff INHALATION RT-QID PRN


     PRN Reason: Shortness Of Breath


   Umeclidinium Bromide [Incruse Ellipta] 2 puff INHALATION RT-DAILY


   Polyethylene Glycol 3350 [Miralax] 17 gm PO DAILY


   Linaclotide [Linzess] 72 mcg PO DAILY


   LORazepam [Ativan] 0.25 mg PO TID PRN


     PRN Reason: Anxiety


   Fluticasone Nasal Spray [Flonase Nasal Spray] 1 spray EA NOSTRIL BID PRN


     PRN Reason: Allergy Symptoms


   Escitalopram [Lexapro] 20 mg PO DAILY


   predniSONE 10 mg PO DAILY


   Pantoprazole Sodium [Protonix] 40 mg PO DAILY


   Montelukast [Singulair] 10 mg PO HS


   Atenolol [Tenormin] 25 mg PO BID


   Aspirin EC [Ecotrin Low Dose] 81 mg PO DAILY


   metFORMIN HCL [Glucophage] 500 mg PO TID


   glipiZIDE XL [Glucotrol Xl] 2.5 mg PO DAILY


   Diltiazem HCl [Diltiazem HCl 24Hr ER (Cd)] 240 mg PO DAILY


   Ondansetron Odt [Zofran Odt] 4 mg PO DAILY


   Diclofenac Sodium [Voltaren Gel] 1 applic TOPICAL DAILY PRN


     PRN Reason: Pain


   HYDROcodone/APAP 10-325MG [Norco ] 1 tab PO TID PRN


     PRN Reason: Pain


Discharge Medication List





Albuterol Nebulized [Ventolin Nebulized] 2.5 mg INHALATION RT-Q4H PRN 02/18/20 

[History]


Albuterol Sulfate [Ventolin HFA] 2 puff INHALATION RT-QID PRN 02/18/20 [History]


Aspirin EC [Ecotrin Low Dose] 81 mg PO DAILY 02/18/20 [History]


Atenolol [Tenormin] 25 mg PO BID 02/18/20 [History]


Budesonide [Pulmicort Flexhaler] 1 puff INHALATION RT-BID 02/18/20 [History]


Diclofenac Sodium [Voltaren Gel] 1 applic TOPICAL DAILY PRN 02/18/20 [History]


Escitalopram [Lexapro] 20 mg PO DAILY 02/18/20 [History]


Fluticasone Nasal Spray [Flonase Nasal Spray] 1 spray EA NOSTRIL BID PRN 

02/18/20 [History]


HYDROcodone/APAP 10-325MG [Norco ] 1 tab PO TID PRN 02/18/20 [History]


Ipratropium-Albuterol Nebulize [Duoneb 0.5 mg-3 mg/3 ml Soln] 3 ml INHALATION 

RT-QID PRN 02/18/20 [History]


LORazepam [Ativan] 0.25 mg PO TID PRN 02/18/20 [History]


Montelukast [Singulair] 10 mg PO HS 02/18/20 [History]


Ondansetron Odt [Zofran Odt] 4 mg PO DAILY 02/18/20 [History]


Pantoprazole Sodium [Protonix] 40 mg PO DAILY 02/18/20 [History]


Polyethylene Glycol 3350 [Miralax] 17 gm PO DAILY 02/18/20 [History]


Umeclidinium Bromide [Incruse Ellipta] 2 puff INHALATION RT-DAILY 02/18/20 

[History]


glipiZIDE XL [Glucotrol Xl] 2.5 mg PO DAILY 02/18/20 [History]


metFORMIN HCL [Glucophage] 500 mg PO TID 02/18/20 [History]


Diltiazem HCl [Diltiazem HCl 24Hr ER (Cd)] 360 mg PO DAILY #0 02/21/20 [Rx]


Formoterol Fumarate [Perforomist] 20 mcg INHALATION RT-BID #60 nebu 02/21/20 

[Rx]


Linaclotide [Linzess] 290 mcg PO DAILY #14 capsule 02/21/20 [Rx]


predniSONE See Taper PO DAILY #30 tab 02/21/20 [Rx]








Follow up Appointment(s)/Referral(s): 


Kosta Ledesma MD [Primary Care Provider] - 1-2 days


Elizabeth Coronel MD [STAFF PHYSICIAN] - 1 Week


Patient Instructions/Handouts:  COPD (Chronic Obstructive Pulmonary Disease) 

(DC), How Your Lungs Work (DC), Chronic Lung Disease and Infection Prevention 

(DC), Pulmonary Rehabilitation (DC), Energy Conservation Techniques (ED)


Activity/Diet/Wound Care/Special Instructions: 


PT SON IS REQUESTING THAT THE HOSPITAL NOT MAKE FOLLOW UP DR WILKINSON FOR HIS MOM - 

HE STATED THAT HE WILL DO IT JUST LET HIM KNOW WHEN AND WHO  WANTS THE PT TO 

SEE. 


Discharge Disposition: HOME SELF-CARE